# Patient Record
Sex: FEMALE | Employment: FULL TIME | URBAN - METROPOLITAN AREA
[De-identification: names, ages, dates, MRNs, and addresses within clinical notes are randomized per-mention and may not be internally consistent; named-entity substitution may affect disease eponyms.]

---

## 2023-12-21 ENCOUNTER — APPOINTMENT (OUTPATIENT)
Dept: ULTRASOUND IMAGING | Age: 28
End: 2023-12-21
Payer: COMMERCIAL

## 2023-12-21 ENCOUNTER — HOSPITAL ENCOUNTER (EMERGENCY)
Age: 28
Discharge: HOME | End: 2023-12-22
Attending: EMERGENCY MEDICINE
Payer: COMMERCIAL

## 2023-12-21 VITALS
TEMPERATURE: 97.5 F | RESPIRATION RATE: 16 BRPM | SYSTOLIC BLOOD PRESSURE: 122 MMHG | DIASTOLIC BLOOD PRESSURE: 72 MMHG | BODY MASS INDEX: 24.99 KG/M2 | HEART RATE: 81 BPM | OXYGEN SATURATION: 98 % | HEIGHT: 65 IN | WEIGHT: 150 LBS

## 2023-12-21 DIAGNOSIS — Z34.90 INTRAUTERINE PREGNANCY: Primary | ICD-10-CM

## 2023-12-21 LAB
AMORPH SED URNS QL MICRO: ABNORMAL /HPF
ANION GAP SERPL CALCULATED.3IONS-SCNC: 10 MMOL/L (ref 3–16)
B-HCG SERPL EIA 3RD IS-ACNC: NORMAL MIU/ML
BACTERIA URNS QL MICRO: ABNORMAL /HPF
BASOPHILS # BLD: 0 K/UL (ref 0–0.2)
BASOPHILS NFR BLD: 0.4 %
BILIRUB UR QL STRIP.AUTO: NEGATIVE
BUN SERPL-MCNC: 7 MG/DL (ref 7–20)
CALCIUM SERPL-MCNC: 9 MG/DL (ref 8.3–10.6)
CHLORIDE SERPL-SCNC: 103 MMOL/L (ref 99–110)
CLARITY UR: ABNORMAL
CO2 SERPL-SCNC: 25 MMOL/L (ref 21–32)
COLOR UR: YELLOW
CREAT SERPL-MCNC: <0.5 MG/DL (ref 0.6–1.1)
DEPRECATED RDW RBC AUTO: 12.9 % (ref 12.4–15.4)
EOSINOPHIL # BLD: 0.2 K/UL (ref 0–0.6)
EOSINOPHIL NFR BLD: 1.9 %
EPI CELLS #/AREA URNS HPF: ABNORMAL /HPF (ref 0–5)
GFR SERPLBLD CREATININE-BSD FMLA CKD-EPI: >60 ML/MIN/{1.73_M2}
GLUCOSE SERPL-MCNC: 98 MG/DL (ref 70–99)
GLUCOSE UR STRIP.AUTO-MCNC: NEGATIVE MG/DL
HCT VFR BLD AUTO: 44.9 % (ref 36–48)
HGB BLD-MCNC: 14.8 G/DL (ref 12–16)
HGB UR QL STRIP.AUTO: NEGATIVE
KETONES UR STRIP.AUTO-MCNC: NEGATIVE MG/DL
LEUKOCYTE ESTERASE UR QL STRIP.AUTO: ABNORMAL
LYMPHOCYTES # BLD: 2.7 K/UL (ref 1–5.1)
LYMPHOCYTES NFR BLD: 22.9 %
MCH RBC QN AUTO: 31.2 PG (ref 26–34)
MCHC RBC AUTO-ENTMCNC: 33.1 G/DL (ref 31–36)
MCV RBC AUTO: 94.2 FL (ref 80–100)
MONOCYTES # BLD: 0.8 K/UL (ref 0–1.3)
MONOCYTES NFR BLD: 6.6 %
MUCOUS THREADS #/AREA URNS LPF: ABNORMAL /LPF
NEUTROPHILS # BLD: 8 K/UL (ref 1.7–7.7)
NEUTROPHILS NFR BLD: 68.2 %
NITRITE UR QL STRIP.AUTO: NEGATIVE
PH UR STRIP.AUTO: 7.5 [PH] (ref 5–8)
PLATELET # BLD AUTO: 218 K/UL (ref 135–450)
PMV BLD AUTO: 8.9 FL (ref 5–10.5)
POTASSIUM SERPL-SCNC: 3.8 MMOL/L (ref 3.5–5.1)
PROT UR STRIP.AUTO-MCNC: NEGATIVE MG/DL
RBC # BLD AUTO: 4.77 M/UL (ref 4–5.2)
RBC #/AREA URNS HPF: ABNORMAL /HPF (ref 0–4)
SODIUM SERPL-SCNC: 138 MMOL/L (ref 136–145)
SP GR UR STRIP.AUTO: 1.01 (ref 1–1.03)
UA COMPLETE W REFLEX CULTURE PNL UR: ABNORMAL
UA DIPSTICK W REFLEX MICRO PNL UR: YES
URN SPEC COLLECT METH UR: ABNORMAL
UROBILINOGEN UR STRIP-ACNC: 0.2 E.U./DL
WBC # BLD AUTO: 11.7 K/UL (ref 4–11)
WBC #/AREA URNS HPF: ABNORMAL /HPF (ref 0–5)

## 2023-12-21 PROCEDURE — 76817 TRANSVAGINAL US OBSTETRIC: CPT

## 2023-12-21 PROCEDURE — 99284 EMERGENCY DEPT VISIT MOD MDM: CPT

## 2023-12-21 PROCEDURE — 6370000000 HC RX 637 (ALT 250 FOR IP): Performed by: EMERGENCY MEDICINE

## 2023-12-21 PROCEDURE — 86901 BLOOD TYPING SEROLOGIC RH(D): CPT

## 2023-12-21 PROCEDURE — 86850 RBC ANTIBODY SCREEN: CPT

## 2023-12-21 PROCEDURE — 84702 CHORIONIC GONADOTROPIN TEST: CPT

## 2023-12-21 PROCEDURE — 85025 COMPLETE CBC W/AUTO DIFF WBC: CPT

## 2023-12-21 PROCEDURE — 76801 OB US < 14 WKS SINGLE FETUS: CPT

## 2023-12-21 PROCEDURE — 87086 URINE CULTURE/COLONY COUNT: CPT

## 2023-12-21 PROCEDURE — 81001 URINALYSIS AUTO W/SCOPE: CPT

## 2023-12-21 PROCEDURE — 86900 BLOOD TYPING SEROLOGIC ABO: CPT

## 2023-12-21 PROCEDURE — 36415 COLL VENOUS BLD VENIPUNCTURE: CPT

## 2023-12-21 PROCEDURE — 80048 BASIC METABOLIC PNL TOTAL CA: CPT

## 2023-12-21 RX ORDER — CEPHALEXIN 500 MG/1
500 CAPSULE ORAL ONCE
Status: COMPLETED | OUTPATIENT
Start: 2023-12-21 | End: 2023-12-21

## 2023-12-21 RX ORDER — ACETAMINOPHEN 500 MG
1000 TABLET ORAL ONCE
Status: COMPLETED | OUTPATIENT
Start: 2023-12-21 | End: 2023-12-21

## 2023-12-21 RX ADMIN — ACETAMINOPHEN 1000 MG: 500 TABLET ORAL at 20:58

## 2023-12-21 RX ADMIN — CEPHALEXIN 500 MG: 500 CAPSULE ORAL at 22:09

## 2023-12-21 ASSESSMENT — PAIN SCALES - GENERAL: PAINLEVEL_OUTOF10: 7

## 2023-12-21 ASSESSMENT — LIFESTYLE VARIABLES
HOW OFTEN DO YOU HAVE A DRINK CONTAINING ALCOHOL: NEVER
HOW MANY STANDARD DRINKS CONTAINING ALCOHOL DO YOU HAVE ON A TYPICAL DAY: PATIENT DOES NOT DRINK

## 2023-12-21 ASSESSMENT — PAIN - FUNCTIONAL ASSESSMENT: PAIN_FUNCTIONAL_ASSESSMENT: 0-10

## 2023-12-22 LAB
ABO + RH BLD: NORMAL
BLD GP AB SCN SERPL QL: NORMAL

## 2023-12-22 RX ORDER — CEPHALEXIN 500 MG/1
500 CAPSULE ORAL 4 TIMES DAILY
Qty: 20 CAPSULE | Refills: 0 | Status: SHIPPED | OUTPATIENT
Start: 2023-12-22 | End: 2023-12-27

## 2023-12-22 NOTE — DISCHARGE INSTRUCTIONS
Please follow up with OB GYN for further evaluation and treatment. US shows single live intrauterine gestation with estimated fetal age of 9 wks and 3 days. Please take the keflex as prescribed. Follow up with the result of the urine culture and ask for further guidance on whether to continue the antibiotic with your PCP or ob gyn. If persistent or worsening symptoms, or if you have any concerns, return to ED immediately.

## 2023-12-22 NOTE — ED PROVIDER NOTES
Patient care handed off to me pending ultrasound at shift change. In summary, patient is a 69-year-old female presents to the emergency department for lower back pain for the last 2 weeks. Says the back pain has been worse with movement and with bending or twisting movements. She has tried lidocaine patches and heat packs with some improvement of pain. She believes she is approximately 7 weeks pregnant based on her last menstrual period. Denies having any abdominal pain/tenderness. Urine culture added on. Ultrasound shows single live intrauterine gestation with estimated fetal age of 7 weeks and 3 days. She was given referral for OB/GYN. Given prescription of Keflex and instructed to follow-up with OB/GYN for further evaluation and treatment. Patient agreeable with plan and verbalized understanding. Denies having any questions or concerns. Discharged home with strict return emergency. ICD-10-CM    1.  Intrauterine pregnancy  Z34.90 [facility]             Keshia Sidhu MD  12/22/23 [id number]

## 2023-12-23 LAB — BACTERIA UR CULT: NORMAL

## 2023-12-27 ENCOUNTER — OFFICE VISIT (OUTPATIENT)
Age: 28
End: 2023-12-27

## 2023-12-27 VITALS
WEIGHT: 150 LBS | TEMPERATURE: 98.5 F | OXYGEN SATURATION: 95 % | HEART RATE: 94 BPM | DIASTOLIC BLOOD PRESSURE: 82 MMHG | SYSTOLIC BLOOD PRESSURE: 119 MMHG | RESPIRATION RATE: 17 BRPM | BODY MASS INDEX: 24.99 KG/M2 | HEIGHT: 65 IN

## 2023-12-27 DIAGNOSIS — N39.0 URINARY TRACT INFECTION WITHOUT HEMATURIA, SITE UNSPECIFIED: Primary | ICD-10-CM

## 2023-12-27 DIAGNOSIS — N30.00 ACUTE CYSTITIS WITHOUT HEMATURIA: ICD-10-CM

## 2023-12-27 LAB
BILIRUBIN, POC: ABNORMAL
BLOOD URINE, POC: ABNORMAL
CLARITY, POC: ABNORMAL
COLOR, POC: YELLOW
GLUCOSE URINE, POC: ABNORMAL
KETONES, POC: ABNORMAL
LEUKOCYTE EST, POC: ABNORMAL
NITRITE, POC: ABNORMAL
PH, POC: 6
PROTEIN, POC: ABNORMAL
SPECIFIC GRAVITY, POC: 1.02
UROBILINOGEN, POC: 0.2

## 2023-12-27 RX ORDER — NITROFURANTOIN 25; 75 MG/1; MG/1
100 CAPSULE ORAL 2 TIMES DAILY
Qty: 20 CAPSULE | Refills: 0 | Status: SHIPPED | OUTPATIENT
Start: 2023-12-27 | End: 2023-12-27

## 2023-12-27 NOTE — PROGRESS NOTES
Enio Blair (: 1995) is a 33 y.o. female, New patient, here for evaluation of the following chief complaint(s):  Urinary Tract Infection (Patient does get UTI all the time and went to the ER and was told she has UTI sharp lower right hip back area, and does not feel like it' went away , and feels like she has kidney pain, patient is pregnant 7 weeks and 2 days. [facility]  )      ASSESSMENT/PLAN:    ICD-10-CM    1. Urinary tract infection without hematuria, site unspecified  N39.0 POCT Urinalysis no Micro     Culture, Urine     DISCONTINUED: nitrofurantoin, macrocrystal-monohydrate, (MACROBID) 100 MG capsule      2. Acute cystitis without hematuria  N30.00 Culture, Urine          - Low concern for pyelonephritis, PID or sepsis. Will wait for urine culture to come back in the next 2-3 days.  - Pt to drink lots of fluids  - Pt to wait to take antibiotic prescribed until given the ok from her PCP  - Pt ok to take tylenol as needed  - Pt to call if any symptoms worsen or follow up with PCP  - Pt to go to ER if have shortness of breath or chest pain      Follow up with your PCP or return to the clinic in 5 days if symptoms persist or if symptoms worsen. Reviewed AVS with treatment instructions and answered questions - pt expresses understanding and agreement with the discussed treatment plan and AVS instructions. SUBJECTIVE/OBJECTIVE:  HPI:   33 y.o. female presents for complaint of a week ago she states she was having lower back pain and pt was seen in the hospital and they had seen abnormalities in the urine so they put her on keflex for 5 days and she states she just finished the antibiotic but she is still experiencing lower back pain. Denies symptoms of fevers, body aches, chills, sweats, abdominal pain, flank pain, urinary urgency, frequency, dysuria, hematuria, vomiting, diarrhea, vaginal discharge, bleeding, odor, itching or rash. Has taken the 5 days of keflex and finished it.  Pt
[id number]/yes

## 2023-12-27 NOTE — PATIENT INSTRUCTIONS
- Pt to drink lots of fluids  - Pt to take all of antibiotic prescribed  - Pt ok to take tylenol as needed  - Pt to call if any symptoms worsen or follow up with PCP  - Pt to go to ER if have shortness of breath or chest pain

## 2023-12-29 LAB — BACTERIA UR CULT: NORMAL

## 2023-12-30 SDOH — HEALTH STABILITY: PHYSICAL HEALTH: ON AVERAGE, HOW MANY MINUTES DO YOU ENGAGE IN EXERCISE AT THIS LEVEL?: 10 MIN

## 2023-12-30 SDOH — HEALTH STABILITY: PHYSICAL HEALTH: ON AVERAGE, HOW MANY DAYS PER WEEK DO YOU ENGAGE IN MODERATE TO STRENUOUS EXERCISE (LIKE A BRISK WALK)?: 0 DAYS

## 2024-01-02 ENCOUNTER — OFFICE VISIT (OUTPATIENT)
Dept: PRIMARY CARE CLINIC | Age: 29
End: 2024-01-02
Payer: COMMERCIAL

## 2024-01-02 VITALS
WEIGHT: 153 LBS | DIASTOLIC BLOOD PRESSURE: 78 MMHG | TEMPERATURE: 97.9 F | SYSTOLIC BLOOD PRESSURE: 132 MMHG | HEART RATE: 91 BPM | HEIGHT: 65 IN | OXYGEN SATURATION: 97 % | BODY MASS INDEX: 25.49 KG/M2

## 2024-01-02 DIAGNOSIS — R53.83 OTHER FATIGUE: ICD-10-CM

## 2024-01-02 DIAGNOSIS — Z76.89 ENCOUNTER TO ESTABLISH CARE: Primary | ICD-10-CM

## 2024-01-02 DIAGNOSIS — Z87.440 RECENT URINARY TRACT INFECTION: ICD-10-CM

## 2024-01-02 DIAGNOSIS — Z3A.08 8 WEEKS GESTATION OF PREGNANCY: ICD-10-CM

## 2024-01-02 DIAGNOSIS — Z88.9 HX OF SEASONAL ALLERGIES: ICD-10-CM

## 2024-01-02 LAB
BILIRUBIN, POC: NEGATIVE
BLOOD URINE, POC: NORMAL
CLARITY, POC: CLEAR
COLOR, POC: YELLOW
GLUCOSE URINE, POC: NEGATIVE
KETONES, POC: NEGATIVE
LEUKOCYTE EST, POC: NORMAL
NITRITE, POC: NEGATIVE
PH, POC: 6.5
PROTEIN, POC: NORMAL
SPECIFIC GRAVITY, POC: 1.02
UROBILINOGEN, POC: NORMAL

## 2024-01-02 PROCEDURE — 81002 URINALYSIS NONAUTO W/O SCOPE: CPT

## 2024-01-02 PROCEDURE — 99204 OFFICE O/P NEW MOD 45 MIN: CPT

## 2024-01-02 SDOH — ECONOMIC STABILITY: FOOD INSECURITY: WITHIN THE PAST 12 MONTHS, THE FOOD YOU BOUGHT JUST DIDN'T LAST AND YOU DIDN'T HAVE MONEY TO GET MORE.: NEVER TRUE

## 2024-01-02 SDOH — ECONOMIC STABILITY: HOUSING INSECURITY
IN THE LAST 12 MONTHS, WAS THERE A TIME WHEN YOU DID NOT HAVE A STEADY PLACE TO SLEEP OR SLEPT IN A SHELTER (INCLUDING NOW)?: NO

## 2024-01-02 SDOH — ECONOMIC STABILITY: INCOME INSECURITY: HOW HARD IS IT FOR YOU TO PAY FOR THE VERY BASICS LIKE FOOD, HOUSING, MEDICAL CARE, AND HEATING?: NOT HARD AT ALL

## 2024-01-02 SDOH — ECONOMIC STABILITY: FOOD INSECURITY: WITHIN THE PAST 12 MONTHS, YOU WORRIED THAT YOUR FOOD WOULD RUN OUT BEFORE YOU GOT MONEY TO BUY MORE.: NEVER TRUE

## 2024-01-02 ASSESSMENT — ANXIETY QUESTIONNAIRES
6. BECOMING EASILY ANNOYED OR IRRITABLE: 1
7. FEELING AFRAID AS IF SOMETHING AWFUL MIGHT HAPPEN: 0
5. BEING SO RESTLESS THAT IT IS HARD TO SIT STILL: 0
2. NOT BEING ABLE TO STOP OR CONTROL WORRYING: 1
4. TROUBLE RELAXING: 1
IF YOU CHECKED OFF ANY PROBLEMS ON THIS QUESTIONNAIRE, HOW DIFFICULT HAVE THESE PROBLEMS MADE IT FOR YOU TO DO YOUR WORK, TAKE CARE OF THINGS AT HOME, OR GET ALONG WITH OTHER PEOPLE: SOMEWHAT DIFFICULT
1. FEELING NERVOUS, ANXIOUS, OR ON EDGE: 0
GAD7 TOTAL SCORE: 4
3. WORRYING TOO MUCH ABOUT DIFFERENT THINGS: 1

## 2024-01-02 ASSESSMENT — PATIENT HEALTH QUESTIONNAIRE - PHQ9
SUM OF ALL RESPONSES TO PHQ QUESTIONS 1-9: 15
3. TROUBLE FALLING OR STAYING ASLEEP: 3
4. FEELING TIRED OR HAVING LITTLE ENERGY: 3
5. POOR APPETITE OR OVEREATING: 2
2. FEELING DOWN, DEPRESSED OR HOPELESS: 3
SUM OF ALL RESPONSES TO PHQ9 QUESTIONS 1 & 2: 6
7. TROUBLE CONCENTRATING ON THINGS, SUCH AS READING THE NEWSPAPER OR WATCHING TELEVISION: 1
10. IF YOU CHECKED OFF ANY PROBLEMS, HOW DIFFICULT HAVE THESE PROBLEMS MADE IT FOR YOU TO DO YOUR WORK, TAKE CARE OF THINGS AT HOME, OR GET ALONG WITH OTHER PEOPLE: 1
8. MOVING OR SPEAKING SO SLOWLY THAT OTHER PEOPLE COULD HAVE NOTICED. OR THE OPPOSITE, BEING SO FIGETY OR RESTLESS THAT YOU HAVE BEEN MOVING AROUND A LOT MORE THAN USUAL: 0
6. FEELING BAD ABOUT YOURSELF - OR THAT YOU ARE A FAILURE OR HAVE LET YOURSELF OR YOUR FAMILY DOWN: 0
SUM OF ALL RESPONSES TO PHQ QUESTIONS 1-9: 15
SUM OF ALL RESPONSES TO PHQ QUESTIONS 1-9: 15
1. LITTLE INTEREST OR PLEASURE IN DOING THINGS: 3
9. THOUGHTS THAT YOU WOULD BE BETTER OFF DEAD, OR OF HURTING YOURSELF: 0
SUM OF ALL RESPONSES TO PHQ QUESTIONS 1-9: 15

## 2024-01-02 NOTE — PROGRESS NOTES
true     Ran Out of Food in the Last Year: Never true   Transportation Needs: Unknown (1/2/2024)    PRAPARE - Transportation     Lack of Transportation (Medical): Not on file     Lack of Transportation (Non-Medical): No   Physical Activity: Inactive (12/30/2023)    Exercise Vital Sign     Days of Exercise per Week: 0 days     Minutes of Exercise per Session: 10 min   Stress: Not on file   Social Connections: Not on file   Intimate Partner Violence: Not on file   Housing Stability: Unknown (1/2/2024)    Housing Stability Vital Sign     Unable to Pay for Housing in the Last Year: Not on file     Number of Places Lived in the Last Year: Not on file     Unstable Housing in the Last Year: No          Objective:   Physical Exam  Constitutional:       Appearance: Normal appearance.   HENT:      Head: Normocephalic and atraumatic.      Nose: Nose normal. No congestion or rhinorrhea.   Eyes:      Extraocular Movements: Extraocular movements intact.      Conjunctiva/sclera: Conjunctivae normal.      Pupils: Pupils are equal, round, and reactive to light.   Cardiovascular:      Rate and Rhythm: Normal rate and regular rhythm.      Pulses: Normal pulses.      Heart sounds: Normal heart sounds.   Pulmonary:      Effort: Pulmonary effort is normal.      Breath sounds: Normal breath sounds.   Abdominal:      General: Abdomen is flat. Bowel sounds are normal.      Palpations: Abdomen is soft.      Tenderness: There is no abdominal tenderness. There is no right CVA tenderness, left CVA tenderness, guarding or rebound.   Musculoskeletal:         General: Normal range of motion.   Skin:     General: Skin is warm and dry.   Neurological:      General: No focal deficit present.      Mental Status: She is alert and oriented to person, place, and time.   Psychiatric:         Mood and Affect: Mood normal.         Behavior: Behavior normal.         Assessment / Plan:     1. Encounter to establish care  -Chart/records reviewed, history and

## 2024-01-04 LAB — BACTERIA UR CULT: NORMAL

## 2024-01-11 ENCOUNTER — INITIAL PRENATAL (OUTPATIENT)
Dept: OBGYN CLINIC | Age: 29
End: 2024-01-11

## 2024-01-11 VITALS
SYSTOLIC BLOOD PRESSURE: 115 MMHG | WEIGHT: 151 LBS | HEART RATE: 95 BPM | BODY MASS INDEX: 25.13 KG/M2 | DIASTOLIC BLOOD PRESSURE: 75 MMHG | TEMPERATURE: 97.8 F | OXYGEN SATURATION: 97 %

## 2024-01-11 DIAGNOSIS — Z34.91 PRENATAL CARE IN FIRST TRIMESTER: Primary | ICD-10-CM

## 2024-01-11 PROCEDURE — 0500F INITIAL PRENATAL CARE VISIT: CPT | Performed by: OBSTETRICS & GYNECOLOGY

## 2024-01-11 NOTE — PROGRESS NOTES
28 y.o.  at Unknown EGA Estimated Date of Delivery: None noted. here for Initial Prenatal Visit:     Pt seen and examined. No concerns/complaints. She reports occasional aching. She was treated UTI in the ER.   Denies VB, LOF, CTX. Denies FM yet.   Denies fevers / chills / chest pain / shortness of breath.   She reports some issues with nausea. She reports no vomiting and that she is keeping food down.     ObHx: primigravida   GynHx: denies abnormal pap smears. Denies genital herpes. Denies gonorrhea or chlymdia; reports irregular cycles LMP 10-30-   PMH: denies   PSH: denies   All: Amoxil- left ring finger swells up and gets red   PNV   SH: denies drinking, smoking or drug use     Objective:  /75   Pulse 95   Temp 97.8 °F (36.6 °C) (Infrared)   Wt 68.5 kg (151 lb)   LMP 10/30/2023 (Exact Date)   SpO2 97%   BMI 25.13 kg/m²   Physical Exam  Vitals and nursing note reviewed. Exam conducted with a chaperone present.   Constitutional:       Appearance: Normal appearance.   HENT:      Head: Normocephalic and atraumatic.   Eyes:      General:         Right eye: No discharge.         Left eye: No discharge.      Conjunctiva/sclera: Conjunctivae normal.   Cardiovascular:      Rate and Rhythm: Normal rate and regular rhythm.      Heart sounds: Normal heart sounds.   Pulmonary:      Effort: Pulmonary effort is normal.      Breath sounds: No stridor. No wheezing or rhonchi.   Chest:   Breasts:     Right: No inverted nipple, mass, nipple discharge, skin change or tenderness.      Left: No inverted nipple, mass, nipple discharge, skin change or tenderness.   Abdominal:      General: Abdomen is flat.      Palpations: Abdomen is soft.      Tenderness: There is no abdominal tenderness. There is no guarding or rebound.   Genitourinary:     General: Normal vulva.      Exam position: Lithotomy position.      Labia:         Right: No tenderness or lesion.         Left: No tenderness or lesion.       Vagina: No

## 2024-01-12 LAB
BACTERIA UR CULT: NORMAL
BACTERIA URNS QL MICRO: ABNORMAL /HPF
BILIRUB UR QL STRIP.AUTO: NEGATIVE
C TRACH DNA CVX QL NAA+PROBE: NEGATIVE
CLARITY UR: ABNORMAL
COLOR UR: YELLOW
EPI CELLS #/AREA URNS AUTO: 4 /HPF (ref 0–5)
GLUCOSE UR STRIP.AUTO-MCNC: 250 MG/DL
HGB UR QL STRIP.AUTO: NEGATIVE
HYALINE CASTS #/AREA URNS AUTO: 0 /LPF (ref 0–8)
KETONES UR STRIP.AUTO-MCNC: ABNORMAL MG/DL
LEUKOCYTE ESTERASE UR QL STRIP.AUTO: NEGATIVE
N GONORRHOEA DNA CERV MUCUS QL NAA+PROBE: NEGATIVE
NITRITE UR QL STRIP.AUTO: NEGATIVE
PH UR STRIP.AUTO: 6.5 [PH] (ref 5–8)
PROT UR STRIP.AUTO-MCNC: ABNORMAL MG/DL
RBC CLUMPS #/AREA URNS AUTO: 4 /HPF (ref 0–4)
SP GR UR STRIP.AUTO: 1.02 (ref 1–1.03)
UA DIPSTICK W REFLEX MICRO PNL UR: YES
URN SPEC COLLECT METH UR: ABNORMAL
UROBILINOGEN UR STRIP-ACNC: 0.2 E.U./DL
WBC #/AREA URNS AUTO: 1 /HPF (ref 0–5)

## 2024-02-07 ENCOUNTER — ROUTINE PRENATAL (OUTPATIENT)
Dept: OBGYN CLINIC | Age: 29
End: 2024-02-07
Payer: COMMERCIAL

## 2024-02-07 VITALS
TEMPERATURE: 97.6 F | SYSTOLIC BLOOD PRESSURE: 115 MMHG | WEIGHT: 154.2 LBS | BODY MASS INDEX: 25.66 KG/M2 | HEART RATE: 89 BPM | DIASTOLIC BLOOD PRESSURE: 85 MMHG

## 2024-02-07 DIAGNOSIS — Z34.92 PRENATAL CARE IN SECOND TRIMESTER: Primary | ICD-10-CM

## 2024-02-07 PROCEDURE — 0502F SUBSEQUENT PRENATAL CARE: CPT | Performed by: OBSTETRICS & GYNECOLOGY

## 2024-02-07 PROCEDURE — 36415 COLL VENOUS BLD VENIPUNCTURE: CPT | Performed by: OBSTETRICS & GYNECOLOGY

## 2024-02-07 NOTE — PROGRESS NOTES
29 y.o.  at 13w2d EGA Estimated Date of Delivery: 24 here for MADIE:     Pt seen and examined. She reports some nausea at night.   Denies VB, LOF, CTX. Endorses (+) FM. Denies fevers / chills / chest pain / shortness of breath.   Denies HA, changes with vision, RUQ pain, edema.     Objective:  /85   Pulse 89   Temp 97.6 °F (36.4 °C) (Infrared)   Wt 69.9 kg (154 lb 3.2 oz)   LMP 10/30/2023 (Exact Date)   BMI 25.66 kg/m²   Gen: AO, NAD  Abd: Soft, NT, gravid   Ext: Mild LE edema    Assessment/Plan:   Diagnosis Orders   1. Prenatal care in second trimester  Syphilis Antibody Cascading Reflex    Varicella Zoster Antibody, IgG    Drug Screen Multi Urine With Bup    Rubella antibody, IgG    Type and Screen    HIV Screen    CBC with Auto Differential    Hep C AB RLFX HCV PCR-A    Hepatitis B Surface Antigen          Diagnosis Orders   1. Prenatal care in second trimester  Syphilis Antibody Cascading Reflex    Varicella Zoster Antibody, IgG    Drug Screen Multi Urine With Bup    Rubella antibody, IgG    Type and Screen    HIV Screen    CBC with Auto Differential    Hep C AB RLFX HCV PCR-A    Hepatitis B Surface Antigen   Reassuring fetal / maternal status today  Aneuploidy / Carrier Screen: sent today     AFP: 15-20   PNL: sent today   Anatomy: plan for here  28 wk: GCT  Hgb Plt   Tdap: at 28 wks    GBS: 35-37 wks   Birth Plan:    Breastfeeding Education:        - reassuring maternal / fetal status     Follow Up  Return OB precautions reviewed   No follow-ups on file.    Approximately 15 minutes spent in room counseling patient on condition and coordination of care with over 50% in direct face to face counseling.     Mahogany Guillaume DO

## 2024-02-08 LAB
ABO + RH BLD: NORMAL
AMPHETAMINES UR QL SCN>1000 NG/ML: NORMAL
BARBITURATES UR QL SCN>200 NG/ML: NORMAL
BASOPHILS # BLD: 0.1 K/UL (ref 0–0.2)
BASOPHILS NFR BLD: 0.5 %
BENZODIAZ UR QL SCN>200 NG/ML: NORMAL
BLD GP AB SCN SERPL QL: NORMAL
BUPRENORPHINE+NOR UR QL SCN: NORMAL
CANNABINOIDS UR QL SCN>50 NG/ML: NORMAL
COCAINE UR QL SCN: NORMAL
DEPRECATED RDW RBC AUTO: 13.2 % (ref 12.4–15.4)
DRUG SCREEN COMMENT UR-IMP: NORMAL
EOSINOPHIL # BLD: 0.1 K/UL (ref 0–0.6)
EOSINOPHIL NFR BLD: 1.5 %
FENTANYL SCREEN, URINE: NORMAL
HBV SURFACE AG SERPL QL IA: NORMAL
HCT VFR BLD AUTO: 42.4 % (ref 36–48)
HGB BLD-MCNC: 14.6 G/DL (ref 12–16)
HIV 1+2 AB+HIV1 P24 AG SERPL QL IA: NORMAL
HIV 2 AB SERPL QL IA: NORMAL
HIV1 AB SERPL QL IA: NORMAL
HIV1 P24 AG SERPL QL IA: NORMAL
LYMPHOCYTES # BLD: 1.6 K/UL (ref 1–5.1)
LYMPHOCYTES NFR BLD: 16.7 %
MCH RBC QN AUTO: 31.8 PG (ref 26–34)
MCHC RBC AUTO-ENTMCNC: 34.5 G/DL (ref 31–36)
MCV RBC AUTO: 92.4 FL (ref 80–100)
METHADONE UR QL SCN>300 NG/ML: NORMAL
MONOCYTES # BLD: 0.5 K/UL (ref 0–1.3)
MONOCYTES NFR BLD: 5.4 %
NEUTROPHILS # BLD: 7.5 K/UL (ref 1.7–7.7)
NEUTROPHILS NFR BLD: 75.9 %
OPIATES UR QL SCN>300 NG/ML: NORMAL
OXYCODONE UR QL SCN: NORMAL
PCP UR QL SCN>25 NG/ML: NORMAL
PH UR STRIP: 7 [PH]
PLATELET # BLD AUTO: 191 K/UL (ref 135–450)
PMV BLD AUTO: 9.9 FL (ref 5–10.5)
RBC # BLD AUTO: 4.59 M/UL (ref 4–5.2)
REAGIN+T PALLIDUM IGG+IGM SERPL-IMP: NORMAL
RUBV IGG SERPL IA-ACNC: 6.7 IU/ML
VZV IGG SER QL IA: NORMAL
WBC # BLD AUTO: 9.8 K/UL (ref 4–11)

## 2024-02-09 LAB
HCV AB S/CO SERPL IA: 0.06 IV
HCV AB SERPL QL IA: NEGATIVE

## 2024-02-12 ENCOUNTER — TELEPHONE (OUTPATIENT)
Dept: OBGYN CLINIC | Age: 29
End: 2024-02-12

## 2024-02-12 NOTE — TELEPHONE ENCOUNTER
----- Message from Mahogany Guillaume DO sent at 2/12/2024  8:55 AM EST -----  Can you please let the patient know that this was the test was ordered?     ----- Message -----  From: Brittny Gutiérrez LPN  Sent: 2/12/2024   8:31 AM EST  To: Mahogany Guillaume, DO    The testing ordered was [id number] with ESS & SCA .     Thank you     Brittny

## 2024-02-16 ENCOUNTER — TELEPHONE (OUTPATIENT)
Dept: OBGYN CLINIC | Age: 29
End: 2024-02-16

## 2024-02-16 NOTE — TELEPHONE ENCOUNTER
Called patient and gave [id number] Results per noted by DR Guillaume. Normal results. Female Fetus.     Patient aware. Results scanned. DONE

## 2024-02-19 ENCOUNTER — OFFICE VISIT (OUTPATIENT)
Age: 29
End: 2024-02-19

## 2024-02-19 VITALS
TEMPERATURE: 98.6 F | BODY MASS INDEX: 26.66 KG/M2 | HEIGHT: 65 IN | DIASTOLIC BLOOD PRESSURE: 76 MMHG | HEART RATE: 109 BPM | SYSTOLIC BLOOD PRESSURE: 113 MMHG | WEIGHT: 160 LBS | RESPIRATION RATE: 16 BRPM | OXYGEN SATURATION: 96 %

## 2024-02-19 DIAGNOSIS — R05.9 COUGH, UNSPECIFIED TYPE: ICD-10-CM

## 2024-02-19 DIAGNOSIS — R52 BODY ACHES: ICD-10-CM

## 2024-02-19 DIAGNOSIS — H67.1: Primary | ICD-10-CM

## 2024-02-19 DIAGNOSIS — U07.1: Primary | ICD-10-CM

## 2024-02-19 LAB
INFLUENZA A ANTIBODY: NEGATIVE
INFLUENZA B ANTIBODY: NEGATIVE
Lab: ABNORMAL
PERFORMING INSTRUMENT: ABNORMAL
QC PASS/FAIL: ABNORMAL
SARS-COV-2, POC: DETECTED

## 2024-02-19 RX ORDER — AZITHROMYCIN 250 MG/1
TABLET, FILM COATED ORAL
Qty: 6 TABLET | Refills: 0 | Status: SHIPPED | OUTPATIENT
Start: 2024-02-19 | End: 2024-02-29

## 2024-02-19 ASSESSMENT — ENCOUNTER SYMPTOMS
VOMITING: 0
COUGH: 1
ABDOMINAL DISTENTION: 0
NAUSEA: 0
SORE THROAT: 1
SHORTNESS OF BREATH: 0

## 2024-02-19 NOTE — PROGRESS NOTES
Vivian An (:  1995) is a 29 y.o. female,Established patient, here for evaluation of the following chief complaint(s):  Cough (X 3 days), Pharyngitis, Generalized Body Aches, and Congestion      ASSESSMENT/PLAN:    ICD-10-CM    1. Otitis media of right ear due to severe acute respiratory syndrome coronavirus 2 (SARS-CoV-2)  U07.1 azithromycin (ZITHROMAX) 250 MG tablet    H67.1       2. Body aches  R52 POCT Influenza A/B     POCT COVID-19, Antigen     CANCELED: POCT COVID-19, Antigen      3. Cough, unspecified type  R05.9 POCT Influenza A/B     POCT COVID-19, Antigen     CANCELED: POCT COVID-19, Antigen          Take medication as prescribed.   Rest and Increase fluids.   Call your obgyn and let them know you have covid.    Antibiotic for ear is Category B: okay in pregnancy.     Follow up with your PCP in the next 1 week.     SUBJECTIVE/OBJECTIVE:  Pt presents with cough, body aches, ear pain, and congestion for 3 days. Is 15 weeks pregnant. No known fevers. Hs been around sick contacts at work. No OTC meds used. Does state her left lower back has been hurting her as well. Did have a UTI the end of December but states has improved since and no longer has dysuria, ect.       History provided by:  Patient   used: No    Cough  The current episode started in the past 7 days. The problem has been waxing and waning. Associated symptoms include ear pain, headaches, myalgias, nasal congestion, postnasal drip and a sore throat. Pertinent negatives include no fever, rash or shortness of breath.   Pharyngitis  Associated symptoms: congestion, cough, ear pain, fatigue, headaches, myalgias and sore throat    Associated symptoms: no fever, no nausea, no rash, no shortness of breath and no vomiting    Generalized Body Aches  Associated symptoms: congestion, cough, ear pain, fatigue, headaches, myalgias and sore throat    Associated symptoms: no fever, no nausea, no rash, no shortness of breath and no

## 2024-02-19 NOTE — PATIENT INSTRUCTIONS
Take medication as prescribed.   Rest and Increase fluids.  Call your obgyn and let them know you have covid.    Antibiotic for ear is Category B: okay in pregnancy.     Follow up with your PCP in the next 1 week.

## 2024-02-29 ENCOUNTER — ROUTINE PRENATAL (OUTPATIENT)
Dept: OBGYN CLINIC | Age: 29
End: 2024-02-29

## 2024-02-29 VITALS
HEART RATE: 89 BPM | SYSTOLIC BLOOD PRESSURE: 110 MMHG | DIASTOLIC BLOOD PRESSURE: 68 MMHG | WEIGHT: 159 LBS | BODY MASS INDEX: 26.46 KG/M2

## 2024-02-29 DIAGNOSIS — Z34.92 PRENATAL CARE IN SECOND TRIMESTER: Primary | ICD-10-CM

## 2024-02-29 DIAGNOSIS — Z86.16 HISTORY OF COVID-19: ICD-10-CM

## 2024-02-29 PROCEDURE — 0502F SUBSEQUENT PRENATAL CARE: CPT | Performed by: OBSTETRICS & GYNECOLOGY

## 2024-02-29 NOTE — PROGRESS NOTES
29 y.o.  at 16w3d EGA Estimated Date of Delivery: 24 here for MADIE:     Pt seen and examined. No concerns/complaints.  Reports some cramping but not today. Denies VB, LOF, CTX. Endorses (+) FM. Denies fevers / chills / chest pain / shortness of breath.   Denies HA, changes with vision, RUQ pain, edema.   MWQ reviewed.     Objective:  /68   Pulse 89   Wt 72.1 kg (159 lb)   LMP 10/30/2023 (Exact Date)   BMI 26.46 kg/m²   Gen: AO, NAD  Abd: Soft, NT, gravid   Ext: Mild LE edema    Assessment/Plan:   Diagnosis Orders   1. Prenatal care in second trimester        2. History of COVID-19            Diagnosis Orders   1. Prenatal care in second trimester     Reassuring fetal / maternal status today  Aneuploidy / Carrier Screen: neg female     AFP: declined  PNL: B+/-, Hep B/C neg, HIV neg, Rub Nonimmune, Varicella nonimmune, syph nr, Justin/chla neg, Urine culture negative.   Anatomy: next visit  28 wk: GCT  Hgb Plt   Tdap: at 28 wks    GBS: 35-37 wks   Birth Plan:    Breastfeeding Education:    2. History of COVID-19     - Started patient on baby asa 81 mg   - Discussed with patient serial growth US due to COVID 19 infection during pregnancy.      - reassuring maternal / fetal status     Follow Up  Return OB precautions reviewed   No follow-ups on file.    Approximately 15 minutes spent in room counseling patient on condition and coordination of care with over 50% in direct face to face counseling.     Mahogany Guillaume DO

## 2024-03-23 ENCOUNTER — OFFICE VISIT (OUTPATIENT)
Age: 29
End: 2024-03-23

## 2024-03-23 VITALS
HEART RATE: 116 BPM | DIASTOLIC BLOOD PRESSURE: 79 MMHG | OXYGEN SATURATION: 96 % | SYSTOLIC BLOOD PRESSURE: 114 MMHG | TEMPERATURE: 98.5 F

## 2024-03-23 DIAGNOSIS — J01.10 ACUTE NON-RECURRENT FRONTAL SINUSITIS: Primary | ICD-10-CM

## 2024-03-23 RX ORDER — CEPHALEXIN 500 MG/1
500 CAPSULE ORAL 2 TIMES DAILY
Qty: 20 CAPSULE | Refills: 0 | Status: SHIPPED | OUTPATIENT
Start: 2024-03-23 | End: 2024-04-02

## 2024-03-23 ASSESSMENT — ENCOUNTER SYMPTOMS
NAUSEA: 0
SHORTNESS OF BREATH: 0
SORE THROAT: 0
ABDOMINAL PAIN: 0
SINUS PRESSURE: 1
SINUS PAIN: 1
VOMITING: 0
COUGH: 1

## 2024-03-23 NOTE — PROGRESS NOTES
Vivian An (:  1995) is a 29 y.o. female,Established patient, here for evaluation of the following chief complaint(s):  Congestion, Cough (X 2-3 weeks ), and Headache      ASSESSMENT/PLAN:    ICD-10-CM    1. Acute non-recurrent frontal sinusitis  J01.10 cephALEXin (KEFLEX) 500 MG capsule          Take medication as prescribed.   Rest and Increase fluids.  Try taking a daily antihistamine such as Claritin or Zyrtec.     Follow up with your PCP in the next 1 week.     SUBJECTIVE/OBJECTIVE:    History provided by:  Patient   used: No    Cough  Associated symptoms include headaches. Pertinent negatives include no ear pain, fever, myalgias, rash, sore throat or shortness of breath.   Headache    HPI:   29 y.o. female presents with symptoms of congestion, cough, headache, and sinus pressure ongoing since 2-3 weeks. Denies fever, SOB, aches. Has taken sinus rinses, mucinex, nasal spray for symptoms without much relief. Has not seen her PCP since this began.     Vitals:    24 1238   BP: 114/79   Pulse: (!) 116   Temp: 98.5 °F (36.9 °C)   TempSrc: Temporal   SpO2: 96%       Review of Systems   Constitutional:  Negative for fatigue and fever.   HENT:  Positive for congestion, sinus pressure and sinus pain. Negative for ear pain and sore throat.    Respiratory:  Positive for cough. Negative for shortness of breath.    Gastrointestinal:  Negative for abdominal pain, nausea and vomiting.   Musculoskeletal:  Negative for myalgias.   Skin:  Negative for rash.   Neurological:  Positive for headaches.       Physical Exam  Constitutional:       General: She is not in acute distress.     Appearance: Normal appearance.   HENT:      Right Ear: Tympanic membrane normal.      Left Ear: Tympanic membrane normal.      Nose:      Right Sinus: Maxillary sinus tenderness and frontal sinus tenderness present.      Left Sinus: Maxillary sinus tenderness and frontal sinus tenderness present.      Comments:

## 2024-03-28 ENCOUNTER — ROUTINE PRENATAL (OUTPATIENT)
Dept: OBGYN CLINIC | Age: 29
End: 2024-03-28

## 2024-03-28 VITALS
DIASTOLIC BLOOD PRESSURE: 84 MMHG | BODY MASS INDEX: 27.92 KG/M2 | OXYGEN SATURATION: 97 % | HEIGHT: 65 IN | TEMPERATURE: 98.6 F | WEIGHT: 167.6 LBS | SYSTOLIC BLOOD PRESSURE: 126 MMHG | HEART RATE: 86 BPM

## 2024-03-28 DIAGNOSIS — Z34.92 PRENATAL CARE IN SECOND TRIMESTER: Primary | ICD-10-CM

## 2024-03-28 DIAGNOSIS — Z86.16 HISTORY OF COVID-19: ICD-10-CM

## 2024-03-28 PROCEDURE — 0502F SUBSEQUENT PRENATAL CARE: CPT | Performed by: OBSTETRICS & GYNECOLOGY

## 2024-03-28 NOTE — PROGRESS NOTES
29 y.o.  at 20w3d EGA Estimated Date of Delivery: 24 here for MADIE:     Pt seen and examined. No concerns/complaints.  She does report that she is currently on Keflex due to a sinus infection. Denies VB, LOF, CTX. Endorses (+) FM. Denies fevers / chills / chest pain / shortness of breath.   Denies HA, changes with vision, RUQ pain, edema.     Objective:  LMP 10/30/2023 (Exact Date)   Gen: AO, NAD  Abd: Soft, NT, gravid   Ext: Mild LE edema    OBSTETRIC ULTRASOUND -- SECOND TRIMESTER     DATE:  2024     PHYSICIAN: DIGNA Guillaume D.O.     SONOGRAPHER: SHAWN Young [facility]     INDICATION:  Second trimester, Anatomical screening     TYPE OF SCAN: vaginal, abdominal 3.5 MHz 5MHz     FINDINGS:       A single viable intrauterine pregnancy is noted in cephalic presentation. Cardiac and somatic activity are noted.     The following values were obtained:              Fetal heart rate                                               143bpm              BPD                                                                 5.04cm                        81.1 %              Head Circumference                                       19.12cm                      81.7 %               Abdominal Circumference                              16.57cm                      80.4 %              Femur Length                                                  3.60cm                        75.4 %              Humerus Length                                             3.24cm                        67.7 %              Cerebellum                                                      2.31cm                        93.3 %              Amniotic fluid DVP                                          3.85cm              EFW                                                                428g                92.2 percentile     Subjective amniotic fluid volume is normal. Based on sonographic criteria, the estimated fetal age is 21weeks and 2days with EDC of

## 2024-04-15 ENCOUNTER — OFFICE VISIT (OUTPATIENT)
Dept: PRIMARY CARE CLINIC | Age: 29
End: 2024-04-15
Payer: COMMERCIAL

## 2024-04-15 VITALS
OXYGEN SATURATION: 97 % | BODY MASS INDEX: 28.99 KG/M2 | HEIGHT: 65 IN | WEIGHT: 174 LBS | HEART RATE: 88 BPM | DIASTOLIC BLOOD PRESSURE: 74 MMHG | TEMPERATURE: 98.2 F | SYSTOLIC BLOOD PRESSURE: 116 MMHG

## 2024-04-15 DIAGNOSIS — H65.03 NON-RECURRENT ACUTE SEROUS OTITIS MEDIA OF BOTH EARS: ICD-10-CM

## 2024-04-15 DIAGNOSIS — J06.9 VIRAL URI: Primary | ICD-10-CM

## 2024-04-15 PROCEDURE — 99213 OFFICE O/P EST LOW 20 MIN: CPT

## 2024-04-15 RX ORDER — CETIRIZINE HYDROCHLORIDE 10 MG/1
10 TABLET ORAL DAILY
Qty: 30 TABLET | Refills: 2 | Status: SHIPPED | OUTPATIENT
Start: 2024-04-15

## 2024-04-15 RX ORDER — FLUTICASONE PROPIONATE 50 MCG
2 SPRAY, SUSPENSION (ML) NASAL DAILY
Qty: 48 G | Refills: 1 | Status: SHIPPED | OUTPATIENT
Start: 2024-04-15

## 2024-04-15 NOTE — PROGRESS NOTES
Vivian An   YOB: 1995    Date of Visit:  4/15/2024     Allergies   Allergen Reactions    Amoxil [Amoxicillin]      Only highest MG dose of Amoxicillin - caused left ring finger to swell up and turn red       Outpatient Medications Marked as Taking for the 4/15/24 encounter (Office Visit) with Amber Leone DO   Medication Sig Dispense Refill    cetirizine (ZYRTEC) 10 MG tablet Take 1 tablet by mouth daily 30 tablet 2    fluticasone (FLONASE) 50 MCG/ACT nasal spray 2 sprays by Each Nostril route daily 48 g 1    Prenatal MV-Min-Fe Fum-FA-DHA (PRENATAL 1 PO) Take by mouth         Wt Readings from Last 3 Encounters:   04/15/24 78.9 kg (174 lb)   03/28/24 76 kg (167 lb 9.6 oz)   02/29/24 72.1 kg (159 lb)     BP Readings from Last 3 Encounters:   04/15/24 116/74   03/28/24 126/84   03/23/24 114/79       HPI: Pt is 23 weeks pregnant. Reports she had COVID mid February, lingered until mid-March. Went to urgent care then and got keflex to help clear up lingering infection, this did help. Woke up Thursday last week with scratchy throat. This has progressed to include coughing up mucus and L ear feeling full today. No drainage from ear. Does have runny nose, post-nasal drip, and hoarseness. No fevers. Used Claritin-D once over the weekend. Does have hx of seasonal allergies at home in [place] which she largely grew out of but this is first spring here. Usually manifests with runny eyes/congestion. Never smoker, no asthma history.     ROS:  As documented in HPI    PHYSICAL EXAM:  Vitals:    04/15/24 1345   BP: 116/74   Site: Right Upper Arm   Position: Sitting   Cuff Size: Medium Adult   Pulse: 88   Temp: 98.2 °F (36.8 °C)   TempSrc: Oral   SpO2: 97%   Weight: 78.9 kg (174 lb)   Height: 1.651 m (5' 5\")     General:  appears well-developed and well-nourished, in no apparent distress  Skin:  normal   Eyes:  normal    ENT:  bilateral tympanic membrane abnormal- bilateral serous effusions, nasal mucosa congested

## 2024-04-24 ENCOUNTER — ROUTINE PRENATAL (OUTPATIENT)
Dept: OBGYN CLINIC | Age: 29
End: 2024-04-24

## 2024-04-24 VITALS
SYSTOLIC BLOOD PRESSURE: 105 MMHG | WEIGHT: 175 LBS | TEMPERATURE: 97.9 F | HEART RATE: 104 BPM | BODY MASS INDEX: 29.12 KG/M2 | DIASTOLIC BLOOD PRESSURE: 65 MMHG

## 2024-04-24 DIAGNOSIS — Z34.92 PRENATAL CARE IN SECOND TRIMESTER: Primary | ICD-10-CM

## 2024-04-24 DIAGNOSIS — Z86.16 HISTORY OF COVID-19: ICD-10-CM

## 2024-04-24 PROCEDURE — 0502F SUBSEQUENT PRENATAL CARE: CPT | Performed by: OBSTETRICS & GYNECOLOGY

## 2024-04-24 NOTE — PROGRESS NOTES
29 y.o.  at 24w2d EGA Estimated Date of Delivery: 24 here for MADIE:     Pt seen and examined. No concerns/complaints.  Denies VB, LOF, CTX. Endorses (+) FM. Denies fevers / chills / chest pain / shortness of breath.   Denies HA, changes with vision, RUQ pain, edema.   MWQ reviewed.     Objective:  /65   Pulse (!) 104   Temp 97.9 °F (36.6 °C) (Infrared)   Wt 79.4 kg (175 lb)   LMP 10/30/2023 (Exact Date)   BMI 29.12 kg/m²   Gen: AO, NAD  Abd: Soft, NT, gravid   Ext: Mild LE edema    Assessment/Plan:   Diagnosis Orders   1. Prenatal care in second trimester        2. History of COVID-19            Diagnosis Orders   1. Prenatal care in second trimester     Reassuring fetal / maternal status today  Aneuploidy / Carrier Screen: neg female     AFP: declined  PNL: B+/-, Hep B/C neg, HIV neg, Rub Nonimmune, Varicella nonimmune, syph nr, Justin/chla neg, Urine culture negative.   Anatomy: 3- will refer to [facility] for completion. Scheduled tomorrow   28 wk: GCT  Hgb Plt next visit   Tdap: at 28 wks    GBS: 35-37 wks   Birth Plan:    Breastfeeding Education:   RTC in 4 weeks    2. History of COVID-19     - Plan for serial growth US.      - reassuring maternal / fetal status     Follow Up  Return OB precautions reviewed   No follow-ups on file.    Approximately 10 minutes spent in room counseling patient on condition and coordination of care with over 50% in direct face to face counseling.     Mahogany Guillaume DO

## 2024-05-22 ENCOUNTER — ROUTINE PRENATAL (OUTPATIENT)
Dept: OBGYN CLINIC | Age: 29
End: 2024-05-22
Payer: COMMERCIAL

## 2024-05-22 VITALS
WEIGHT: 184 LBS | DIASTOLIC BLOOD PRESSURE: 70 MMHG | TEMPERATURE: 98 F | BODY MASS INDEX: 30.62 KG/M2 | HEART RATE: 89 BPM | SYSTOLIC BLOOD PRESSURE: 115 MMHG

## 2024-05-22 DIAGNOSIS — Z23 NEED FOR PERTUSSIS VACCINATION: ICD-10-CM

## 2024-05-22 DIAGNOSIS — Z34.93 PRENATAL CARE IN THIRD TRIMESTER: Primary | ICD-10-CM

## 2024-05-22 DIAGNOSIS — Z86.16 HISTORY OF COVID-19: ICD-10-CM

## 2024-05-22 PROCEDURE — 90471 IMMUNIZATION ADMIN: CPT | Performed by: OBSTETRICS & GYNECOLOGY

## 2024-05-22 PROCEDURE — 36415 COLL VENOUS BLD VENIPUNCTURE: CPT | Performed by: OBSTETRICS & GYNECOLOGY

## 2024-05-22 PROCEDURE — 0502F SUBSEQUENT PRENATAL CARE: CPT | Performed by: OBSTETRICS & GYNECOLOGY

## 2024-05-22 PROCEDURE — 90715 TDAP VACCINE 7 YRS/> IM: CPT | Performed by: OBSTETRICS & GYNECOLOGY

## 2024-05-22 NOTE — PROGRESS NOTES
10:43 AM Given Tdap (Adacel) vaccine 0.5mL IM  Site:Right deltoid.   Lot # [id number]  Expiration Date: 8/8/26  NDC #82781-032-68  Patient tolerated well. No reaction noted after 20 minutes. VIS sheet provided.  Administered by: Brittny Gutiérrez LPN

## 2024-05-22 NOTE — PROGRESS NOTES
29 y.o.  at 28w2d EGA Estimated Date of Delivery: 24 here for MADIE:     Pt seen and examined. She reports some itching on her lower abdomen as well as thighs and possible rash that started approximately one month ago.   Denies VB, LOF, CTX. Endorses (+) FM. Denies fevers / chills / chest pain / shortness of breath.   Denies HA, changes with vision, RUQ pain, edema.     Objective:  /70   Pulse 89   Temp 98 °F (36.7 °C) (Infrared)   Wt 83.5 kg (184 lb)   LMP 10/30/2023 (Exact Date)   BMI 30.62 kg/m²   Gen: AO, NAD  Abd: Soft, NT, gravid   Ext: Mild LE edema    Assessment/Plan:   Diagnosis Orders   1. Prenatal care in third trimester  Glucose Challenge Gestational    CBC with Auto Differential      2. History of COVID-19        3. Need for pertussis vaccination  Tdap, BOOSTRIX, (age 10 yrs+), IM          Diagnosis Orders   1. Prenatal care in third trimester  Glucose Challenge Gestational    CBC with Auto Differential   Reassuring fetal / maternal status today  Aneuploidy / Carrier Screen: neg female     AFP: declined  PNL: B+/-, Hep B/C neg, HIV neg, Rub Nonimmune, Varicella nonimmune, syph nr, Justin/chla neg, Urine culture negative.   Anatomy: 3- will refer to [facility] for completion. Scheduled tomorrow   28 wk: GCT  Hgb Plt sent today  Tdap: 24  GBS: 35-37 wks   Birth Plan:    Breastfeeding Education:   RTC in 2 weeks with growth secondary to COVID infection   Encouraged hydrocortisone for itching.    2. History of COVID-19        3. Need for pertussis vaccination  Tdap, BOOSTRIX, (age 10 yrs+), IM        - reassuring maternal / fetal status     Follow Up  Return OB precautions reviewed   Return in about 2 weeks (around 2024).    Approximately 10 minutes spent in room counseling patient on condition and coordination of care with over 50% in direct face to face counseling.     Mahogany Guillaume DO

## 2024-05-23 LAB
BASOPHILS # BLD: 0 K/UL (ref 0–0.2)
BASOPHILS NFR BLD: 0 %
DEPRECATED RDW RBC AUTO: 14.2 % (ref 12.4–15.4)
EOSINOPHIL # BLD: 0.1 K/UL (ref 0–0.6)
EOSINOPHIL NFR BLD: 1 %
GLUCOSE 1H P 50 G GLC PO SERPL-MCNC: 175 MG/DL
HCT VFR BLD AUTO: 35.3 % (ref 36–48)
HGB BLD-MCNC: 11.9 G/DL (ref 12–16)
LYMPHOCYTES # BLD: 1.1 K/UL (ref 1–5.1)
LYMPHOCYTES NFR BLD: 10 %
MCH RBC QN AUTO: 32.4 PG (ref 26–34)
MCHC RBC AUTO-ENTMCNC: 33.5 G/DL (ref 31–36)
MCV RBC AUTO: 96.5 FL (ref 80–100)
METAMYELOCYTES NFR BLD MANUAL: 1 %
MONOCYTES # BLD: 0.5 K/UL (ref 0–1.3)
MONOCYTES NFR BLD: 5 %
NEUTROPHILS # BLD: 8.8 K/UL (ref 1.7–7.7)
NEUTROPHILS NFR BLD: 74 %
NEUTS BAND NFR BLD MANUAL: 9 % (ref 0–7)
PLATELET # BLD AUTO: 227 K/UL (ref 135–450)
PLATELET BLD QL SMEAR: ADEQUATE
PMV BLD AUTO: 9.3 FL (ref 5–10.5)
RBC # BLD AUTO: 3.66 M/UL (ref 4–5.2)
SLIDE REVIEW: ABNORMAL
WBC # BLD AUTO: 10.5 K/UL (ref 4–11)

## 2024-06-07 ENCOUNTER — ROUTINE PRENATAL (OUTPATIENT)
Dept: OBGYN CLINIC | Age: 29
End: 2024-06-07
Payer: COMMERCIAL

## 2024-06-07 VITALS
TEMPERATURE: 97.5 F | DIASTOLIC BLOOD PRESSURE: 78 MMHG | HEART RATE: 101 BPM | SYSTOLIC BLOOD PRESSURE: 115 MMHG | BODY MASS INDEX: 30.62 KG/M2 | WEIGHT: 184 LBS

## 2024-06-07 DIAGNOSIS — Z86.16 HISTORY OF COVID-19: ICD-10-CM

## 2024-06-07 DIAGNOSIS — O99.810 GLUCOSE INTOLERANCE OF PREGNANCY: ICD-10-CM

## 2024-06-07 DIAGNOSIS — Z34.93 PRENATAL CARE IN THIRD TRIMESTER: Primary | ICD-10-CM

## 2024-06-07 PROCEDURE — 36415 COLL VENOUS BLD VENIPUNCTURE: CPT | Performed by: OBSTETRICS & GYNECOLOGY

## 2024-06-07 PROCEDURE — 0502F SUBSEQUENT PRENATAL CARE: CPT | Performed by: OBSTETRICS & GYNECOLOGY

## 2024-06-07 RX ORDER — HYDROCORTISONE ACETATE 25 MG/1
25 SUPPOSITORY RECTAL EVERY 12 HOURS
Qty: 15 SUPPOSITORY | Refills: 0 | Status: SHIPPED | OUTPATIENT
Start: 2024-06-07

## 2024-06-07 NOTE — PROGRESS NOTES
29 y.o.  at 30w4d EGA Estimated Date of Delivery: 24 here for MADIE:     Pt seen and examined. No concerns/complaints.  She denies any issues with itching. Denies VB, LOF, CTX. Endorses (+) FM. Denies fevers / chills / chest pain / shortness of breath.   Denies HA, changes with vision, RUQ pain, edema.   MWQ reviewed.     Objective:  /78   Pulse (!) 101   Temp 97.5 °F (36.4 °C) (Infrared)   Wt 83.5 kg (184 lb)   LMP 10/30/2023 (Exact Date)   BMI 30.62 kg/m²   Gen: AO, NAD  Abd: Soft, NT, gravid   Ext: Mild LE edema    OBSTETRICAL ULTRASOUND GROWTH     DATE: 2024     PHYSICIAN: DIGNA Guillaume D.O.      SONOGRAPHER: SHAWN Young RDMS     INDICATION: Growth,      TYPE OF SCAN: abdominal     FINDINGS:  A single viable intrauterine pregnancy is noted in cephalic presentation. Cardiac and somatic activity are noted.     The following values were obtained:              Fetal heart rate                                               143bpm              BPD                                                                 7.93cm                        76.7 %              Head Circumference                                       30.63cm                      95.5 %               Abdominal Circumference                              30.12cm                      99 %              Femur Length                                                  6.10cm                        67.2 %              Amniotic fluid index                                         15.52cm              EFW                                                                2141g              98.3 percentile     Amniotic fluid volume is normal. Based on sonographic criteria the estimated fetal age is 33weeks and 0days with EDC of 2024. There is a 14 day discordance with the established EDC of 2024.      The patient has a posterior placenta that is adequate distance in relation to the internal cervical os. The evaluation of the lower

## 2024-06-08 LAB
GLUCOSE 1H P 100 G GLC PO SERPL-MCNC: 197 MG/DL
GLUCOSE 2H P 100 G GLC PO SERPL-MCNC: 182 MG/DL
GLUCOSE 3H P 100 G GLC PO SERPL-MCNC: 151 MG/DL
GLUCOSE BS SERPL-MCNC: 86 MG/DL

## 2024-06-10 ENCOUNTER — TELEPHONE (OUTPATIENT)
Dept: OBGYN CLINIC | Age: 29
End: 2024-06-10

## 2024-06-10 NOTE — TELEPHONE ENCOUNTER
Called in supplies. Glucometer, testing strips, lancets and alcohol pads to her pharmacy. Requested that pharmacy run under whatever brand patient's insurance allows.     Referral to D[facility] completed and faxed. DONE

## 2024-06-10 NOTE — TELEPHONE ENCOUNTER
Called patient to discuss results. Please place referral to DAPP program. Also please send testing supplies to [facility] in [place]. Thanks.

## 2024-06-20 ENCOUNTER — ROUTINE PRENATAL (OUTPATIENT)
Dept: OBGYN CLINIC | Age: 29
End: 2024-06-20

## 2024-06-20 VITALS
SYSTOLIC BLOOD PRESSURE: 112 MMHG | HEART RATE: 89 BPM | WEIGHT: 183.2 LBS | DIASTOLIC BLOOD PRESSURE: 70 MMHG | TEMPERATURE: 98 F | BODY MASS INDEX: 30.49 KG/M2

## 2024-06-20 DIAGNOSIS — O24.410 DIET CONTROLLED GESTATIONAL DIABETES MELLITUS (GDM) IN THIRD TRIMESTER: ICD-10-CM

## 2024-06-20 DIAGNOSIS — O26.843 UTERINE SIZE-DATE DISCREPANCY IN THIRD TRIMESTER: ICD-10-CM

## 2024-06-20 DIAGNOSIS — Z34.93 PRENATAL CARE IN THIRD TRIMESTER: Primary | ICD-10-CM

## 2024-06-20 DIAGNOSIS — Z86.16 HISTORY OF COVID-19: ICD-10-CM

## 2024-06-22 PROBLEM — O26.843 UTERINE SIZE-DATE DISCREPANCY IN THIRD TRIMESTER: Status: ACTIVE | Noted: 2024-06-22

## 2024-06-22 PROBLEM — O24.410 DIET CONTROLLED GESTATIONAL DIABETES MELLITUS (GDM) IN THIRD TRIMESTER: Status: ACTIVE | Noted: 2024-06-22

## 2024-06-22 PROBLEM — Z34.93 PRENATAL CARE IN THIRD TRIMESTER: Status: ACTIVE | Noted: 2024-02-29

## 2024-06-24 ENCOUNTER — ROUTINE PRENATAL (OUTPATIENT)
Dept: OBGYN CLINIC | Age: 29
End: 2024-06-24

## 2024-06-24 VITALS
WEIGHT: 183 LBS | DIASTOLIC BLOOD PRESSURE: 82 MMHG | OXYGEN SATURATION: 98 % | HEIGHT: 65 IN | BODY MASS INDEX: 30.49 KG/M2 | HEART RATE: 88 BPM | SYSTOLIC BLOOD PRESSURE: 117 MMHG | TEMPERATURE: 97.6 F

## 2024-06-24 DIAGNOSIS — Z34.93 PRENATAL CARE IN THIRD TRIMESTER: Primary | ICD-10-CM

## 2024-06-24 DIAGNOSIS — O24.410 DIET CONTROLLED GESTATIONAL DIABETES MELLITUS (GDM) IN THIRD TRIMESTER: ICD-10-CM

## 2024-06-24 DIAGNOSIS — O09.93 HIGH-RISK PREGNANCY IN THIRD TRIMESTER: ICD-10-CM

## 2024-06-24 DIAGNOSIS — Z86.16 HISTORY OF COVID-19: ICD-10-CM

## 2024-06-24 PROCEDURE — 0502F SUBSEQUENT PRENATAL CARE: CPT | Performed by: OBSTETRICS & GYNECOLOGY

## 2024-06-24 NOTE — PROGRESS NOTES
Return OB Office Visit    CC:   Chief Complaint   Patient presents with    Pregnancy Ultrasound    Routine Prenatal Visit       HPI:  Pt seen and examined. No concerns/complaints. Denies VB, LOF, ctx. +FM.    GDM  - fastin, 103, 99, 99  - 1hr PP: 192, 136, 166, 120, 118, 161, 108, 125, 177  - qHS: 122, 130, 106  Has an appointment with DAPP on Friday    Maternal wellness questionnaire reviewed - no concerns today. Score 14.     Objective:  /82   Pulse 88   Temp 97.6 °F (36.4 °C) (Temporal)   Ht 1.651 m (5' 5\")   Wt 83 kg (183 lb)   LMP 10/30/2023 (Exact Date)   SpO2 98%   BMI 30.45 kg/m²   Gen: AO, NAD  Abd: Soft, NT  FHT: 132    Assessment/Plan:  29 y.o.  at 33w0d (Estimated Date of Delivery: 24) presents for MADIE appointment:      Diagnosis Orders   1. Prenatal care in third trimester        2. High-risk pregnancy in third trimester        3. History of COVID-19        4. Diet controlled gestational diabetes mellitus (GDM) in third trimester          Doing well today, routine care  - FWB reassuring on US today, BPP 8/8  - BG as above with persistently elevated readings, we reviewed dietary changes, watching carbs and adding bedtime snack for AM elevations. Has f/u with DAPP on 24. Also reviewed possible need for insulin if persistently elevated, questionss answered  - q4wk growth US    Dispo: RTC in 1 week, DAPP on 24  Bria Blackmon MD

## 2024-07-02 ENCOUNTER — ROUTINE PRENATAL (OUTPATIENT)
Dept: OBGYN CLINIC | Age: 29
End: 2024-07-02

## 2024-07-02 VITALS
DIASTOLIC BLOOD PRESSURE: 76 MMHG | WEIGHT: 182.6 LBS | HEART RATE: 78 BPM | BODY MASS INDEX: 30.39 KG/M2 | TEMPERATURE: 97.8 F | SYSTOLIC BLOOD PRESSURE: 124 MMHG

## 2024-07-02 DIAGNOSIS — O24.410 DIET CONTROLLED GESTATIONAL DIABETES MELLITUS (GDM) IN THIRD TRIMESTER: ICD-10-CM

## 2024-07-02 DIAGNOSIS — O26.843 UTERINE SIZE-DATE DISCREPANCY IN THIRD TRIMESTER: ICD-10-CM

## 2024-07-02 DIAGNOSIS — Z34.93 PRENATAL CARE IN THIRD TRIMESTER: Primary | ICD-10-CM

## 2024-07-02 PROCEDURE — 0502F SUBSEQUENT PRENATAL CARE: CPT | Performed by: OBSTETRICS & GYNECOLOGY

## 2024-07-02 NOTE — PROGRESS NOTES
30 y/o  female at 34 weeks 1 day gestation with EDC 24 presents for prenatal visit and ultrasound.    Patient is established with Dr. Guillaume.   Pregnancy is complicated by large for gestational age, gestational diabetes (data collecting in progress, has follow up  appointment with DAPP program on 24), and maternal weight.   Glucose levels:  Fastin-106  1 hour postprandial breakfast:127-154  1 hour postprandial lunch: 119-146  1 hour postprandial dinner: 117-122  Glucose levels still elevated--considering insulin therapy.   Denies vaginal bleeding, loss of fluid, contractions and decreased fetal movement.  Admits to mild chest pain when lying on let side and cramping.    Denies headaches, vision changes, and RUQ pain.   Denies fever, chills, shortness of breath, nausea, vomiting, diarrhea, constipation, dysuria and hematuria.   Maternal Wellness Questionnaire reviewed--no concerns.     24:  g, 92.2%  24: EFW: 2141 g, 98.3%  24: EFW 2828 g, 90.6%    OBSTETRIC ULTRASOUND GROWTH     DATE: 2024     PHYSICIAN: SHAWN Gastelum D.O.      SONOGRAPHER: SHAWN Young [facility]     INDICATION: Growth, BPP     TYPE OF SCAN: abdominal     FINDINGS:  A single viable intrauterine pregnancy is noted in cephalic presentation. Cardiac and somatic activity are noted.     The following values were obtained:              Fetal heart rate                                               152bpm              BPD                                                                 8.82cm                        86.4 %              Head Circumference                                       32.56cm                      82.1 %               Abdominal Circumference                              31.73cm                      89.7 %              Femur Length                                                  7.13cm                        92.1 %              Amniotic fluid index                                         14.26cm

## 2024-07-09 ENCOUNTER — TELEPHONE (OUTPATIENT)
Dept: OBGYN CLINIC | Age: 29
End: 2024-07-09

## 2024-07-09 NOTE — TELEPHONE ENCOUNTER
Patient is requesting to switch providers from Dr. Guillaume to Dr. Gastelum following her pregnancy. Sending message to providers as MIKKI

## 2024-07-11 ENCOUNTER — ROUTINE PRENATAL (OUTPATIENT)
Dept: OBGYN CLINIC | Age: 29
End: 2024-07-11

## 2024-07-11 VITALS
HEART RATE: 101 BPM | SYSTOLIC BLOOD PRESSURE: 105 MMHG | WEIGHT: 182.6 LBS | BODY MASS INDEX: 30.39 KG/M2 | DIASTOLIC BLOOD PRESSURE: 80 MMHG

## 2024-07-11 DIAGNOSIS — O26.843 UTERINE SIZE-DATE DISCREPANCY IN THIRD TRIMESTER: ICD-10-CM

## 2024-07-11 DIAGNOSIS — O24.414 INSULIN CONTROLLED GESTATIONAL DIABETES MELLITUS (GDM) IN THIRD TRIMESTER: ICD-10-CM

## 2024-07-11 DIAGNOSIS — Z34.93 PRENATAL CARE IN THIRD TRIMESTER: Primary | ICD-10-CM

## 2024-07-11 PROCEDURE — 0502F SUBSEQUENT PRENATAL CARE: CPT | Performed by: OBSTETRICS & GYNECOLOGY

## 2024-07-15 ENCOUNTER — ROUTINE PRENATAL (OUTPATIENT)
Dept: OBGYN CLINIC | Age: 29
End: 2024-07-15

## 2024-07-15 VITALS
SYSTOLIC BLOOD PRESSURE: 120 MMHG | HEART RATE: 93 BPM | BODY MASS INDEX: 30.52 KG/M2 | WEIGHT: 183.4 LBS | TEMPERATURE: 98.3 F | DIASTOLIC BLOOD PRESSURE: 80 MMHG

## 2024-07-15 DIAGNOSIS — Z86.16 HISTORY OF COVID-19: ICD-10-CM

## 2024-07-15 DIAGNOSIS — O24.410 DIET CONTROLLED GESTATIONAL DIABETES MELLITUS (GDM) IN THIRD TRIMESTER: ICD-10-CM

## 2024-07-15 DIAGNOSIS — Z34.93 PRENATAL CARE IN THIRD TRIMESTER: Primary | ICD-10-CM

## 2024-07-15 DIAGNOSIS — O26.843 UTERINE SIZE-DATE DISCREPANCY IN THIRD TRIMESTER: ICD-10-CM

## 2024-07-15 DIAGNOSIS — O09.93 HIGH-RISK PREGNANCY IN THIRD TRIMESTER: ICD-10-CM

## 2024-07-15 PROCEDURE — 0502F SUBSEQUENT PRENATAL CARE: CPT | Performed by: OBSTETRICS & GYNECOLOGY

## 2024-07-15 NOTE — PROGRESS NOTES
29 y.o.  at 37w0d EGA Estimated Date of Delivery: 24 here for MADIE:     Pt seen and examined. No concerns/complaints.  Denies VB, LOF, CTX. Endorses (+) FM.   Denies fevers / chills / chest pain / shortness of breath.   Denies HA, changes with vision, RUQ pain, edema.   MWQ reviewed.     Pregnancy is complicated by large for gestational age, gestational diabetes (data collecting in progress, has follow up  appointment with DAPP program on 24), and maternal weight.    Glucose levels: did not bring in to review. Fasting highest 93. Highest Post Prandial 188     Denies vaginal bleeding, loss of fluid, contractions and decreased fetal movement.  Admits to mild chest pain when lying on let side and cramping.    Denies headaches, vision changes, and RUQ pain.   Denies fever, chills, shortness of breath, nausea, vomiting, diarrhea, constipation, dysuria and hematuria.   Maternal Wellness Questionnaire reviewed--no concerns.     24:  g, 92.2%  24: EFW: 2141 g, 98.3%  24: EFW 2828 g, 90.6%    Per UC;  IUP at 36w2d by LMP equal to our first scan (TANIYA by our records is 2024).  GDMA2 on insulin. She continues to high post breakfast, and does not want to start log. We will start some acarbose, 25 mg, just with breakfast.  Fetus with normal growth. The abdominal circumference is >99th percentile today, but the EFW is 3540 gm. I reviewed the concern of macrosomia with her today including the risk of shoulder dystocia. I do not believe our estimation will put her at over 4500 gm to offer a  for the indication of risk of shoulder dystocia, however we can recheck growth at 38 weeks to get an updated estimation. She did ask about whether she could have a  section for maternal preference, which is reasonable and I reviewed increased risks of  with infection, bleeding and especially with subsequent pregnancies. She has been getting once weekly testing, but now that she is on

## 2024-07-18 ENCOUNTER — ROUTINE PRENATAL (OUTPATIENT)
Dept: OBGYN CLINIC | Age: 29
End: 2024-07-18

## 2024-07-18 VITALS
BODY MASS INDEX: 30.46 KG/M2 | SYSTOLIC BLOOD PRESSURE: 126 MMHG | WEIGHT: 182.8 LBS | OXYGEN SATURATION: 96 % | DIASTOLIC BLOOD PRESSURE: 83 MMHG | HEART RATE: 83 BPM | HEIGHT: 65 IN | TEMPERATURE: 98 F

## 2024-07-18 DIAGNOSIS — O09.93 HIGH-RISK PREGNANCY IN THIRD TRIMESTER: ICD-10-CM

## 2024-07-18 DIAGNOSIS — Z34.93 PRENATAL CARE IN THIRD TRIMESTER: Primary | ICD-10-CM

## 2024-07-18 DIAGNOSIS — O26.843 UTERINE SIZE-DATE DISCREPANCY IN THIRD TRIMESTER: ICD-10-CM

## 2024-07-18 DIAGNOSIS — O24.414 INSULIN CONTROLLED GESTATIONAL DIABETES MELLITUS (GDM) IN THIRD TRIMESTER: ICD-10-CM

## 2024-07-18 PROBLEM — O24.410 DIET CONTROLLED GESTATIONAL DIABETES MELLITUS (GDM) IN THIRD TRIMESTER: Status: RESOLVED | Noted: 2024-06-22 | Resolved: 2024-07-18

## 2024-07-18 LAB
GP B STREP SPEC QL CULT: ABNORMAL
ORGANISM: ABNORMAL

## 2024-07-18 NOTE — PROGRESS NOTES
Return OB Office Visit    CC:   Chief Complaint   Patient presents with    Routine Prenatal Visit       HPI:  Pt seen and examined. No concerns/complaints. Denies VB, LOF. Some mohamud-esquivel contractions. +FM.     Seeing DAPP program -- checking BG logs, next appt with them on 24  - NPH 10 in AM, 5 at bedtime  - acarbose 25mg in AM as well     Maternal wellness questionnaire reviewed - no concerns today.     Objective:  /83   Pulse 83   Temp 98 °F (36.7 °C) (Temporal)   Ht 1.651 m (5' 5\")   Wt 82.9 kg (182 lb 12.8 oz)   LMP 10/30/2023 (Exact Date)   SpO2 96%   BMI 30.42 kg/m²   Gen: AO, NAD  Abd: Soft, NT  FHT: 140    Assessment/Plan:  29 y.o.  at 37w3d (Estimated Date of Delivery: 24) presents for MADIE appointment:      Diagnosis Orders   1. Prenatal care in third trimester        2. Uterine size-date discrepancy in third trimester        3. Insulin controlled gestational diabetes mellitus (GDM) in third trimester  30835 - MI FETAL NON-STRESS TEST      4. High-risk pregnancy in third trimester          Doing well today, routine care  - seeing DAPP program for GDM, now on insulin and oral meds  - q4wk growth US, 2x/week ANFS -- NST today reassuring  - delivery planning pending glucose control, likely 39 weeks, will need to schedule IOL at subsequent visit  - plan for growth US at next visit (at 3 weeks) to assess for route of delivery as pt is concerned for implications of macrosomia with GDM and possible need for  delivery. Will readdress after growth US to discuss recommendations    Dispo: RTC in 3-4 days  Bria Blackmon MD

## 2024-07-23 ENCOUNTER — ROUTINE PRENATAL (OUTPATIENT)
Dept: OBGYN CLINIC | Age: 29
End: 2024-07-23

## 2024-07-23 VITALS
DIASTOLIC BLOOD PRESSURE: 78 MMHG | BODY MASS INDEX: 30.15 KG/M2 | SYSTOLIC BLOOD PRESSURE: 133 MMHG | WEIGHT: 181.2 LBS | HEART RATE: 82 BPM

## 2024-07-23 DIAGNOSIS — B95.1 POSITIVE GBS TEST: ICD-10-CM

## 2024-07-23 DIAGNOSIS — O36.63X0 FETAL MACROSOMIA DURING PREGNANCY IN THIRD TRIMESTER, SINGLE OR UNSPECIFIED FETUS: ICD-10-CM

## 2024-07-23 DIAGNOSIS — O26.843 UTERINE SIZE-DATE DISCREPANCY IN THIRD TRIMESTER: ICD-10-CM

## 2024-07-23 DIAGNOSIS — Z34.93 PRENATAL CARE IN THIRD TRIMESTER: Primary | ICD-10-CM

## 2024-07-23 DIAGNOSIS — O24.414 INSULIN CONTROLLED GESTATIONAL DIABETES MELLITUS (GDM) IN THIRD TRIMESTER: ICD-10-CM

## 2024-07-23 DIAGNOSIS — Z86.16 HISTORY OF COVID-19: ICD-10-CM

## 2024-07-23 NOTE — PROGRESS NOTES
Return OB Office Visit    CC:   Chief Complaint   Patient presents with    Routine Prenatal Visit       HPI:  Patient seen and examined. Doing well today without complaints.     Pregnancy is complicated by large for gestational age, gestational diabetes (sees DAPP program), uterine size-dates discrepancy, hx of COVID-19, GBS positive testing and maternal weight.     Reports on Sunday she went for a walk and after she got home she had some nausea, cramping and was lightheaded.      Denies VB, LOF, ctx. +FM.  Denies headaches, vision changes, RUQ pain, increased LE edema.  Denies chest pain, shortness of breath, fever, chills, nausea, vomiting.      Is following with DAPP program.  Does not have logs today, states fasting this AM was 94.  Current regimen is NPH 10 units in AM and 5 units at bedtime with Acarbose 25mg prior to each meal (is not taking with lighter meals).    Review of Systems: The following ROS was otherwise negative, except as noted in the HPI: constitutional, HEENT, respiratory, cardiovascular, gastrointestinal, genitourinary, skin, musculoskeletal, neurological, psych    Objective:  /78   Pulse 82   Wt 82.2 kg (181 lb 3.2 oz)   LMP 10/30/2023 (Exact Date)   BMI 30.15 kg/m²     Physical Exam  Vitals reviewed.   Constitutional:       General: She is not in acute distress.     Appearance: She is well-developed.   HENT:      Head: Normocephalic and atraumatic.   Eyes:      Conjunctiva/sclera: Conjunctivae normal.   Cardiovascular:      Rate and Rhythm: Normal rate.   Pulmonary:      Effort: Pulmonary effort is normal. No respiratory distress.   Abdominal:      General: There is no distension.      Palpations: Abdomen is soft.      Tenderness: There is no abdominal tenderness. There is no guarding or rebound.   Genitourinary:     General: Normal vulva.      Vagina: No vaginal discharge.   Musculoskeletal:         General: No swelling.   Skin:     General: Skin is warm and dry.   Neurological:

## 2024-07-24 NOTE — PROGRESS NOTES
28th  Following with DAPP program.   Twice weekly ANFS  Growth scan every 4 weeks  Follow  up prn and 3-4 days for prenatal visit and fetal monitoring.

## 2024-07-26 ENCOUNTER — ROUTINE PRENATAL (OUTPATIENT)
Dept: OBGYN CLINIC | Age: 29
End: 2024-07-26

## 2024-07-26 VITALS
SYSTOLIC BLOOD PRESSURE: 128 MMHG | TEMPERATURE: 98 F | DIASTOLIC BLOOD PRESSURE: 64 MMHG | WEIGHT: 181.8 LBS | BODY MASS INDEX: 30.25 KG/M2 | HEART RATE: 80 BPM

## 2024-07-26 DIAGNOSIS — B95.1 POSITIVE GBS TEST: ICD-10-CM

## 2024-07-26 DIAGNOSIS — O36.63X0 FETAL MACROSOMIA DURING PREGNANCY IN THIRD TRIMESTER, SINGLE OR UNSPECIFIED FETUS: ICD-10-CM

## 2024-07-26 DIAGNOSIS — Z86.16 HISTORY OF COVID-19: ICD-10-CM

## 2024-07-26 DIAGNOSIS — Z34.93 PRENATAL CARE IN THIRD TRIMESTER: Primary | ICD-10-CM

## 2024-07-26 DIAGNOSIS — O24.414 INSULIN CONTROLLED GESTATIONAL DIABETES MELLITUS (GDM) IN THIRD TRIMESTER: ICD-10-CM

## 2024-07-26 DIAGNOSIS — O26.843 UTERINE SIZE-DATE DISCREPANCY IN THIRD TRIMESTER: ICD-10-CM

## 2024-07-28 PROBLEM — O36.63X0 FETAL MACROSOMIA IN PREGNANCY IN THIRD TRIMESTER: Status: ACTIVE | Noted: 2024-07-28

## 2024-07-28 PROBLEM — B95.1 POSITIVE GBS TEST: Status: ACTIVE | Noted: 2024-07-28

## 2024-07-28 PROBLEM — Z3A.08 8 WEEKS GESTATION OF PREGNANCY: Status: RESOLVED | Noted: 2024-01-02 | Resolved: 2024-07-28

## 2024-07-29 ENCOUNTER — ROUTINE PRENATAL (OUTPATIENT)
Dept: OBGYN CLINIC | Age: 29
End: 2024-07-29
Payer: COMMERCIAL

## 2024-07-29 VITALS
SYSTOLIC BLOOD PRESSURE: 120 MMHG | HEART RATE: 88 BPM | BODY MASS INDEX: 30.02 KG/M2 | TEMPERATURE: 97 F | DIASTOLIC BLOOD PRESSURE: 74 MMHG | WEIGHT: 180.4 LBS

## 2024-07-29 DIAGNOSIS — B95.1 POSITIVE GBS TEST: ICD-10-CM

## 2024-07-29 DIAGNOSIS — O24.414 INSULIN CONTROLLED GESTATIONAL DIABETES MELLITUS (GDM) IN THIRD TRIMESTER: ICD-10-CM

## 2024-07-29 DIAGNOSIS — Z34.93 PRENATAL CARE IN THIRD TRIMESTER: ICD-10-CM

## 2024-07-29 DIAGNOSIS — O09.93 HIGH-RISK PREGNANCY IN THIRD TRIMESTER: Primary | ICD-10-CM

## 2024-07-29 DIAGNOSIS — O36.63X0 FETAL MACROSOMIA DURING PREGNANCY IN THIRD TRIMESTER, SINGLE OR UNSPECIFIED FETUS: ICD-10-CM

## 2024-07-29 DIAGNOSIS — Z86.16 HISTORY OF COVID-19: ICD-10-CM

## 2024-07-29 DIAGNOSIS — O26.843 UTERINE SIZE-DATE DISCREPANCY IN THIRD TRIMESTER: ICD-10-CM

## 2024-07-29 PROCEDURE — 59025 FETAL NON-STRESS TEST: CPT | Performed by: OBSTETRICS & GYNECOLOGY

## 2024-07-29 PROCEDURE — 0502F SUBSEQUENT PRENATAL CARE: CPT | Performed by: OBSTETRICS & GYNECOLOGY

## 2024-07-29 NOTE — PROGRESS NOTES
29 y.o.  at 39w0d EGA Estimated Date of Delivery: 24 here for MADIE:     Pt seen and examined. No concerns/complaints.  Denies VB, LOF, CTX. Endorses (+) FM. Denies fevers / chills / chest pain / shortness of breath.   Denies HA, changes with vision, RUQ pain, edema.   MWQ reviewed.     Pregnancy is complicated by large for gestational age, gestational diabetes (data collecting in progress, has follow up  appointment with DAPP program on 24), and maternal weight.    Glucose levels: did not bring in to review. Fasting highest 93. Highest Post Prandial 188     24:  g, 92.2%  24: EFW: 2141 g, 98.3%  24: EFW 2828 g, 90.6%    Per UC;  IUP at 36w2d by LMP equal to our first scan (TANIYA by our records is 2024).  GDMA2 on insulin. She continues to high post breakfast, and does not want to start log. We will start some acarbose, 25 mg, just with breakfast.  Fetus with normal growth. The abdominal circumference is >99th percentile today, but the EFW is 3540 gm. I reviewed the concern of macrosomia with her today including the risk of shoulder dystocia. I do not believe our estimation will put her at over 4500 gm to offer a  for the indication of risk of shoulder dystocia, however we can recheck growth at 38 weeks to get an updated estimation. She did ask about whether she could have a  section for maternal preference, which is reasonable and I reviewed increased risks of  with infection, bleeding and especially with subsequent pregnancies. She has been getting once weekly testing, but now that she is on insulin we recommend twice per week testing.    Objective:  /74 (Site: Right Upper Arm, Position: Sitting, Cuff Size: Medium Adult)   Pulse 88   Temp 97 °F (36.1 °C) (Infrared)   Wt 81.8 kg (180 lb 6.4 oz)   LMP 10/30/2023 (Exact Date)   BMI 30.02 kg/m²   Gen: AO, NAD  Abd: Soft, NT, gravid   Ext: Mild LE edema  OMM: Increased lumbar lordosis    NST   Reactive

## 2024-07-31 ENCOUNTER — ANESTHESIA EVENT (OUTPATIENT)
Dept: LABOR AND DELIVERY | Age: 29
End: 2024-07-31
Payer: COMMERCIAL

## 2024-07-31 ENCOUNTER — APPOINTMENT (OUTPATIENT)
Dept: LABOR AND DELIVERY | Age: 29
End: 2024-07-31
Payer: COMMERCIAL

## 2024-07-31 ENCOUNTER — HOSPITAL ENCOUNTER (INPATIENT)
Age: 29
LOS: 2 days | Discharge: HOME | End: 2024-08-02
Attending: OBSTETRICS & GYNECOLOGY | Admitting: OBSTETRICS & GYNECOLOGY
Payer: COMMERCIAL

## 2024-07-31 ENCOUNTER — ANESTHESIA (OUTPATIENT)
Dept: LABOR AND DELIVERY | Age: 29
End: 2024-07-31
Payer: COMMERCIAL

## 2024-07-31 PROBLEM — Z37.9 NORMAL LABOR: Status: ACTIVE | Noted: 2024-07-31

## 2024-07-31 LAB
ABO + RH BLD: NORMAL
AMPHETAMINES UR QL SCN>1000 NG/ML: NORMAL
BARBITURATES UR QL SCN>200 NG/ML: NORMAL
BASOPHILS # BLD: 0 K/UL (ref 0–0.2)
BASOPHILS NFR BLD: 0.2 %
BENZODIAZ UR QL SCN>200 NG/ML: NORMAL
BLD GP AB SCN SERPL QL: NORMAL
BUPRENORPHINE+NOR UR QL SCN: NORMAL
CANNABINOIDS UR QL SCN>50 NG/ML: NORMAL
COCAINE UR QL SCN: NORMAL
DEPRECATED RDW RBC AUTO: 14.6 % (ref 12.4–15.4)
DRUG SCREEN COMMENT UR-IMP: NORMAL
EOSINOPHIL # BLD: 0.1 K/UL (ref 0–0.6)
EOSINOPHIL NFR BLD: 1.1 %
FENTANYL SCREEN, URINE: NORMAL
GLUCOSE BLD-MCNC: 105 MG/DL (ref 70–99)
GLUCOSE BLD-MCNC: 107 MG/DL (ref 70–99)
GLUCOSE BLD-MCNC: 107 MG/DL (ref 70–99)
GLUCOSE BLD-MCNC: 151 MG/DL (ref 70–99)
GLUCOSE BLD-MCNC: 73 MG/DL (ref 70–99)
GLUCOSE BLD-MCNC: 94 MG/DL (ref 70–99)
GLUCOSE BLD-MCNC: 95 MG/DL (ref 70–99)
GLUCOSE BLD-MCNC: 97 MG/DL (ref 70–99)
HCT VFR BLD AUTO: 42.2 % (ref 36–48)
HGB BLD-MCNC: 14.3 G/DL (ref 12–16)
LYMPHOCYTES # BLD: 1.5 K/UL (ref 1–5.1)
LYMPHOCYTES NFR BLD: 16.8 %
MCH RBC QN AUTO: 32.4 PG (ref 26–34)
MCHC RBC AUTO-ENTMCNC: 34 G/DL (ref 31–36)
MCV RBC AUTO: 95.3 FL (ref 80–100)
METHADONE UR QL SCN>300 NG/ML: NORMAL
MONOCYTES # BLD: 0.6 K/UL (ref 0–1.3)
MONOCYTES NFR BLD: 7 %
NEUTROPHILS # BLD: 6.6 K/UL (ref 1.7–7.7)
NEUTROPHILS NFR BLD: 74.9 %
OPIATES UR QL SCN>300 NG/ML: NORMAL
OXYCODONE UR QL SCN: NORMAL
PCP UR QL SCN>25 NG/ML: NORMAL
PERFORMED ON: ABNORMAL
PERFORMED ON: NORMAL
PH UR STRIP: 7 [PH]
PLATELET # BLD AUTO: 174 K/UL (ref 135–450)
PMV BLD AUTO: 10.1 FL (ref 5–10.5)
RBC # BLD AUTO: 4.43 M/UL (ref 4–5.2)
REAGIN+T PALLIDUM IGG+IGM SERPL-IMP: NORMAL
WBC # BLD AUTO: 8.9 K/UL (ref 4–11)

## 2024-07-31 PROCEDURE — 59400 OBSTETRICAL CARE: CPT | Performed by: OBSTETRICS & GYNECOLOGY

## 2024-07-31 PROCEDURE — 2580000003 HC RX 258: Performed by: OBSTETRICS & GYNECOLOGY

## 2024-07-31 PROCEDURE — 2500000003 HC RX 250 WO HCPCS: Performed by: OBSTETRICS & GYNECOLOGY

## 2024-07-31 PROCEDURE — 7200000001 HC VAGINAL DELIVERY

## 2024-07-31 PROCEDURE — 0HQ9XZZ REPAIR PERINEUM SKIN, EXTERNAL APPROACH: ICD-10-PCS | Performed by: OBSTETRICS & GYNECOLOGY

## 2024-07-31 PROCEDURE — 6360000002 HC RX W HCPCS: Performed by: OBSTETRICS & GYNECOLOGY

## 2024-07-31 PROCEDURE — 51701 INSERT BLADDER CATHETER: CPT

## 2024-07-31 PROCEDURE — 86900 BLOOD TYPING SEROLOGIC ABO: CPT

## 2024-07-31 PROCEDURE — 86850 RBC ANTIBODY SCREEN: CPT

## 2024-07-31 PROCEDURE — 85025 COMPLETE CBC W/AUTO DIFF WBC: CPT

## 2024-07-31 PROCEDURE — 6370000000 HC RX 637 (ALT 250 FOR IP): Performed by: OBSTETRICS & GYNECOLOGY

## 2024-07-31 PROCEDURE — 86780 TREPONEMA PALLIDUM: CPT

## 2024-07-31 PROCEDURE — 2500000003 HC RX 250 WO HCPCS: Performed by: NURSE ANESTHETIST, CERTIFIED REGISTERED

## 2024-07-31 PROCEDURE — 6360000002 HC RX W HCPCS: Performed by: NURSE ANESTHETIST, CERTIFIED REGISTERED

## 2024-07-31 PROCEDURE — 1220000000 HC SEMI PRIVATE OB R&B

## 2024-07-31 PROCEDURE — 86901 BLOOD TYPING SEROLOGIC RH(D): CPT

## 2024-07-31 PROCEDURE — 80307 DRUG TEST PRSMV CHEM ANLYZR: CPT

## 2024-07-31 RX ORDER — SODIUM CHLORIDE 0.9 % (FLUSH) 0.9 %
5-40 SYRINGE (ML) INJECTION EVERY 12 HOURS SCHEDULED
Status: DISCONTINUED | OUTPATIENT
Start: 2024-07-31 | End: 2024-08-02 | Stop reason: HOSPADM

## 2024-07-31 RX ORDER — SODIUM CHLORIDE 0.9 % (FLUSH) 0.9 %
5-40 SYRINGE (ML) INJECTION EVERY 12 HOURS SCHEDULED
Status: DISCONTINUED | OUTPATIENT
Start: 2024-07-31 | End: 2024-07-31

## 2024-07-31 RX ORDER — TERBUTALINE SULFATE 1 MG/ML
0.25 INJECTION, SOLUTION SUBCUTANEOUS
Status: DISCONTINUED | OUTPATIENT
Start: 2024-07-31 | End: 2024-07-31

## 2024-07-31 RX ORDER — ONDANSETRON 4 MG/1
4 TABLET, ORALLY DISINTEGRATING ORAL EVERY 6 HOURS PRN
Status: DISCONTINUED | OUTPATIENT
Start: 2024-07-31 | End: 2024-07-31

## 2024-07-31 RX ORDER — SODIUM CHLORIDE 9 MG/ML
INJECTION, SOLUTION INTRAVENOUS PRN
Status: DISCONTINUED | OUTPATIENT
Start: 2024-07-31 | End: 2024-07-31

## 2024-07-31 RX ORDER — DEXTROSE MONOHYDRATE 50 MG/ML
100 INJECTION, SOLUTION INTRAVENOUS PRN
Status: DISCONTINUED | OUTPATIENT
Start: 2024-07-31 | End: 2024-07-31

## 2024-07-31 RX ORDER — CARBOPROST TROMETHAMINE 250 UG/ML
250 INJECTION, SOLUTION INTRAMUSCULAR PRN
Status: DISCONTINUED | OUTPATIENT
Start: 2024-07-31 | End: 2024-07-31

## 2024-07-31 RX ORDER — SODIUM CHLORIDE 0.9 % (FLUSH) 0.9 %
5-40 SYRINGE (ML) INJECTION PRN
Status: DISCONTINUED | OUTPATIENT
Start: 2024-07-31 | End: 2024-07-31

## 2024-07-31 RX ORDER — SODIUM CHLORIDE, SODIUM LACTATE, POTASSIUM CHLORIDE, AND CALCIUM CHLORIDE .6; .31; .03; .02 G/100ML; G/100ML; G/100ML; G/100ML
500 INJECTION, SOLUTION INTRAVENOUS PRN
Status: DISCONTINUED | OUTPATIENT
Start: 2024-07-31 | End: 2024-07-31

## 2024-07-31 RX ORDER — INSULIN LISPRO 100 [IU]/ML
0-4 INJECTION, SOLUTION INTRAVENOUS; SUBCUTANEOUS NIGHTLY
Status: DISCONTINUED | OUTPATIENT
Start: 2024-07-31 | End: 2024-08-02 | Stop reason: HOSPADM

## 2024-07-31 RX ORDER — GLUCAGON 1 MG/ML
1 KIT INJECTION PRN
Status: DISCONTINUED | OUTPATIENT
Start: 2024-07-31 | End: 2024-07-31

## 2024-07-31 RX ORDER — ONDANSETRON 2 MG/ML
4 INJECTION INTRAMUSCULAR; INTRAVENOUS EVERY 6 HOURS PRN
Status: DISCONTINUED | OUTPATIENT
Start: 2024-07-31 | End: 2024-08-02 | Stop reason: HOSPADM

## 2024-07-31 RX ORDER — LANOLIN 100 %
OINTMENT (GRAM) TOPICAL PRN
Status: DISCONTINUED | OUTPATIENT
Start: 2024-07-31 | End: 2024-08-02 | Stop reason: HOSPADM

## 2024-07-31 RX ORDER — BUPIVACAINE HYDROCHLORIDE 2.5 MG/ML
INJECTION, SOLUTION EPIDURAL; INFILTRATION; INTRACAUDAL
Status: COMPLETED
Start: 2024-07-31 | End: 2024-07-31

## 2024-07-31 RX ORDER — IBUPROFEN 800 MG/1
800 TABLET ORAL EVERY 8 HOURS SCHEDULED
Status: DISCONTINUED | OUTPATIENT
Start: 2024-07-31 | End: 2024-08-02 | Stop reason: HOSPADM

## 2024-07-31 RX ORDER — INSULIN LISPRO 100 [IU]/ML
0-4 INJECTION, SOLUTION INTRAVENOUS; SUBCUTANEOUS PRN
Status: DISCONTINUED | OUTPATIENT
Start: 2024-07-31 | End: 2024-07-31

## 2024-07-31 RX ORDER — ACETAMINOPHEN 500 MG
1000 TABLET ORAL EVERY 8 HOURS SCHEDULED
Status: DISCONTINUED | OUTPATIENT
Start: 2024-07-31 | End: 2024-08-02 | Stop reason: HOSPADM

## 2024-07-31 RX ORDER — ONDANSETRON 4 MG/1
4 TABLET, ORALLY DISINTEGRATING ORAL EVERY 6 HOURS PRN
Status: DISCONTINUED | OUTPATIENT
Start: 2024-07-31 | End: 2024-08-02 | Stop reason: HOSPADM

## 2024-07-31 RX ORDER — FAMOTIDINE 10 MG/ML
20 INJECTION, SOLUTION INTRAVENOUS 2 TIMES DAILY
Status: DISCONTINUED | OUTPATIENT
Start: 2024-07-31 | End: 2024-07-31

## 2024-07-31 RX ORDER — PENICILLIN G POTASSIUM 5000000 [IU]/1
INJECTION, POWDER, FOR SOLUTION INTRAMUSCULAR; INTRAVENOUS
Status: DISCONTINUED
Start: 2024-07-31 | End: 2024-07-31

## 2024-07-31 RX ORDER — DEXTROSE MONOHYDRATE AND SODIUM CHLORIDE 5; .45 G/100ML; G/100ML
INJECTION, SOLUTION INTRAVENOUS CONTINUOUS
Status: DISCONTINUED | OUTPATIENT
Start: 2024-07-31 | End: 2024-07-31

## 2024-07-31 RX ORDER — MISOPROSTOL 100 UG/1
400 TABLET ORAL PRN
Status: DISCONTINUED | OUTPATIENT
Start: 2024-07-31 | End: 2024-07-31

## 2024-07-31 RX ORDER — NALBUPHINE HYDROCHLORIDE 10 MG/ML
10 INJECTION, SOLUTION INTRAMUSCULAR; INTRAVENOUS; SUBCUTANEOUS
Status: DISCONTINUED | OUTPATIENT
Start: 2024-07-31 | End: 2024-07-31

## 2024-07-31 RX ORDER — FERROUS SULFATE 325(65) MG
325 TABLET ORAL EVERY OTHER DAY
Status: DISCONTINUED | OUTPATIENT
Start: 2024-08-01 | End: 2024-08-02 | Stop reason: HOSPADM

## 2024-07-31 RX ORDER — ONDANSETRON 2 MG/ML
4 INJECTION INTRAMUSCULAR; INTRAVENOUS EVERY 6 HOURS PRN
Status: DISCONTINUED | OUTPATIENT
Start: 2024-07-31 | End: 2024-07-31

## 2024-07-31 RX ORDER — SODIUM CHLORIDE 9 MG/ML
INJECTION, SOLUTION INTRAVENOUS PRN
Status: DISCONTINUED | OUTPATIENT
Start: 2024-07-31 | End: 2024-08-02 | Stop reason: HOSPADM

## 2024-07-31 RX ORDER — M-VIT,TX,IRON,MINS/CALC/FOLIC 27MG-0.4MG
1 TABLET ORAL DAILY
COMMUNITY

## 2024-07-31 RX ORDER — TRANEXAMIC ACID 10 MG/ML
1000 INJECTION, SOLUTION INTRAVENOUS
Status: DISCONTINUED | OUTPATIENT
Start: 2024-07-31 | End: 2024-07-31

## 2024-07-31 RX ORDER — SODIUM CHLORIDE 9 MG/ML
25 INJECTION, SOLUTION INTRAVENOUS PRN
Status: DISCONTINUED | OUTPATIENT
Start: 2024-07-31 | End: 2024-07-31

## 2024-07-31 RX ORDER — FAMOTIDINE 10 MG/ML
20 INJECTION, SOLUTION INTRAVENOUS 2 TIMES DAILY
Status: DISCONTINUED | OUTPATIENT
Start: 2024-07-31 | End: 2024-08-02 | Stop reason: HOSPADM

## 2024-07-31 RX ORDER — METHYLERGONOVINE MALEATE 0.2 MG/ML
200 INJECTION INTRAVENOUS PRN
Status: DISCONTINUED | OUTPATIENT
Start: 2024-07-31 | End: 2024-07-31

## 2024-07-31 RX ORDER — BUPIVACAINE HYDROCHLORIDE 2.5 MG/ML
INJECTION, SOLUTION EPIDURAL; INFILTRATION; INTRACAUDAL PRN
Status: DISCONTINUED | OUTPATIENT
Start: 2024-07-31 | End: 2024-07-31 | Stop reason: SDUPTHER

## 2024-07-31 RX ORDER — INSULIN LISPRO 100 [IU]/ML
0-4 INJECTION, SOLUTION INTRAVENOUS; SUBCUTANEOUS
Status: DISCONTINUED | OUTPATIENT
Start: 2024-07-31 | End: 2024-08-02 | Stop reason: HOSPADM

## 2024-07-31 RX ORDER — DOCUSATE SODIUM 100 MG/1
100 CAPSULE, LIQUID FILLED ORAL 2 TIMES DAILY
Status: DISCONTINUED | OUTPATIENT
Start: 2024-07-31 | End: 2024-08-02 | Stop reason: HOSPADM

## 2024-07-31 RX ORDER — ACARBOSE 25 MG/1
25 TABLET ORAL
COMMUNITY

## 2024-07-31 RX ORDER — INSULIN LISPRO 100 [IU]/ML
0-4 INJECTION, SOLUTION INTRAVENOUS; SUBCUTANEOUS
Status: DISCONTINUED | OUTPATIENT
Start: 2024-08-01 | End: 2024-07-31

## 2024-07-31 RX ORDER — SODIUM CHLORIDE 0.9 % (FLUSH) 0.9 %
5-40 SYRINGE (ML) INJECTION PRN
Status: DISCONTINUED | OUTPATIENT
Start: 2024-07-31 | End: 2024-08-02 | Stop reason: HOSPADM

## 2024-07-31 RX ORDER — SODIUM CHLORIDE, SODIUM LACTATE, POTASSIUM CHLORIDE, CALCIUM CHLORIDE 600; 310; 30; 20 MG/100ML; MG/100ML; MG/100ML; MG/100ML
INJECTION, SOLUTION INTRAVENOUS CONTINUOUS
Status: DISCONTINUED | OUTPATIENT
Start: 2024-07-31 | End: 2024-07-31

## 2024-07-31 RX ADMIN — DEXTROSE AND SODIUM CHLORIDE: 5; 450 INJECTION, SOLUTION INTRAVENOUS at 08:04

## 2024-07-31 RX ADMIN — SODIUM CHLORIDE, POTASSIUM CHLORIDE, SODIUM LACTATE AND CALCIUM CHLORIDE 500 ML: 600; 310; 30; 20 INJECTION, SOLUTION INTRAVENOUS at 08:07

## 2024-07-31 RX ADMIN — SODIUM CHLORIDE, POTASSIUM CHLORIDE, SODIUM LACTATE AND CALCIUM CHLORIDE 1000 ML: 600; 310; 30; 20 INJECTION, SOLUTION INTRAVENOUS at 07:18

## 2024-07-31 RX ADMIN — Medication 166.7 ML: at 19:47

## 2024-07-31 RX ADMIN — Medication 1 MILLI-UNITS/MIN: at 11:25

## 2024-07-31 RX ADMIN — IBUPROFEN 800 MG: 800 TABLET, FILM COATED ORAL at 23:57

## 2024-07-31 RX ADMIN — DEXTROSE MONOHYDRATE 5 MILLION UNITS: 5 INJECTION INTRAVENOUS at 05:41

## 2024-07-31 RX ADMIN — Medication 2.5 MILLION UNITS: at 13:04

## 2024-07-31 RX ADMIN — Medication 15 ML/HR: at 07:55

## 2024-07-31 RX ADMIN — Medication 2.5 MILLION UNITS: at 09:46

## 2024-07-31 RX ADMIN — FAMOTIDINE 20 MG: 10 INJECTION, SOLUTION INTRAVENOUS at 18:30

## 2024-07-31 RX ADMIN — BUPIVACAINE HYDROCHLORIDE 1 ML: 2.5 INJECTION, SOLUTION EPIDURAL; INFILTRATION; INTRACAUDAL; PERINEURAL at 07:50

## 2024-07-31 RX ADMIN — Medication 2.5 MILLION UNITS: at 17:02

## 2024-07-31 RX ADMIN — DEXTROSE AND SODIUM CHLORIDE: 5; 450 INJECTION, SOLUTION INTRAVENOUS at 14:31

## 2024-07-31 ASSESSMENT — PAIN DESCRIPTION - LOCATION: LOCATION: ABDOMEN;BACK;PERINEUM

## 2024-07-31 ASSESSMENT — PAIN DESCRIPTION - DESCRIPTORS: DESCRIPTORS: CRAMPING

## 2024-07-31 NOTE — H&P
Obstetrics History and Physical    CC:   Chief Complaint   Patient presents with    Rupture of Membranes     24 0150       HPI: Vivian An is a 29 y.o.  at 39w2d who presents with complaints of leaking fluid.     Reports she woke up and went to the bathroom this morning around 0150.  States she felt a pop and then a gush of fluid and has continued leaking since that time.  Reports fluid is blood tinged and clear.  Reports contractions have increased in intensity since arrival in triage.  +FM.  Denies headache, vision changes, RUQ pain, increased LE edema.  Denies chest pain, shortness of breath, fever, chills, nausea, vomiting.      Pregnancy is complicated by GDMA2, fetal macrosomia, uterine size-dates discrepancy, GBS positive status and history of COVID-19.     Review of Systems: The following ROS was otherwise negative, except as noted in the HPI: constitutional, HEENT, respiratory, cardiovascular, gastrointestinal, genitourinary, skin, musculoskeletal, neurological, psych    OBGYN Provider : SANTANA Guillaume DO     Obstetrical History:  OB History    Para Term  AB Living   1 0 0 0 0 0   SAB IAB Ectopic Molar Multiple Live Births   0 0 0 0 0 0      # Outcome Date GA Lbr Wai/2nd Weight Sex Delivery Anes PTL Lv   1 Current              Past Medical History:   Past Medical History:   Diagnosis Date    Seasonal allergies      Medications:  Prior to Admission medications    Medication Sig Start Date End Date Taking? Authorizing Provider   Multiple Vitamins-Minerals (THERAPEUTIC MULTIVITAMIN-MINERALS) tablet Take 1 tablet by mouth daily   Yes Neris Swanson MD   acarbose (PRECOSE) 25 MG tablet Take 1 tablet by mouth 3 times daily (with meals)   Yes Neris Swanson MD   insulin NPH (HUMULIN N;NOVOLIN N) 100 UNIT/ML injection vial Inject 5 Units into the skin nightly   Yes Neris Swanson MD   Prenatal MV-Min-Fe Fum-FA-DHA (PRENATAL 1 PO) Take by mouth    Neris Swanson MD

## 2024-07-31 NOTE — PROGRESS NOTES
Brief Labor note:     29 y.o.  @ 39w2d, here after SROM in active labor.   Pt seen and examined.   Comfortable with epidural.  Doing well.  Denies HA / vision changes, RUQ pain or worsening edema at this time.     FHT:   135 bpm   mod variability  (-) accels, (-) decels   Contractions q 5-6 min     VE:    AC/100/0   Forebag noted -- ruptured with thick mec     IV Pitocin: 1mU     Plan:  Continue IV Pitocin per protocol   Reassuring fetal status   Continuous fetal monitoring   Notify peds of mec fluid / request delivery attendance     Please call with questions or concerns   Laverne Aguilar,

## 2024-07-31 NOTE — PROGRESS NOTES
Pt presents to triage stating she felt a lot of fetal movement this evening and then a \"pop\" followed by a gush of fluid at 0150. Fluid was very blood-tinged. Has noted an increase in contractions since this episode, and continues to leak a large amount of fluid. Placed on EFM at this time.

## 2024-07-31 NOTE — ANESTHESIA PROCEDURE NOTES
CSE Block    Patient location during procedure: OB  Start time: 7/31/2024 7:42 AM  End time: 7/31/2024 7:50 AM  Reason for block: labor epidural  Staffing  Performed: resident/CRNA   Anesthesiologist: Xavi Fong MD  Resident/CRNA: Ryan Vergara APRN - CRNA  Performed by: Ryan Vergara APRN - CRNA  Authorized by: Xavi Fong MD    CSE  Patient position: sitting  Prep: Betadine  Patient monitoring: continuous pulse ox and frequent blood pressure checks  Approach: midline  Provider prep: mask and sterile gloves  Spinal Needle  Needle type: pencil-tip   Needle gauge: 25 G  Needle length: 4.75 in  Epidural Needle  Injection technique: JERSEY saline  Needle type: Tuohy   Needle gauge: 17 G  Needle length: 3.5 in  Location: lumbar (1-5)  Catheter  Catheter type: side hole  Catheter size: 19 G  Test dose: negative (3 cc of 1.5 % xylocaine with Epi)  AssessmentT8  Hemodynamics: stable  Additional Notes  Pt. prepped and draped in sterile fashion. Skin wheal with 1% lidocaine. 17ga touhy needle to JERSEY. 25 ga. Spinal needle per touhy. CSF visualized in hub and 1 cc of 0.25 % Bupivacaine injected. Needle withdrawn and catheter threaded. Negative test dose. Catheter secured with sterile dressing.  Preanesthetic Checklist  Completed: patient identified, IV checked, risks and benefits discussed, equipment checked, pre-op evaluation, anesthesia consent given, oxygen available and monitors applied/VS acknowledged

## 2024-07-31 NOTE — ANESTHESIA PRE PROCEDURE
Department of Anesthesiology  Preprocedure Note       Name:  Vivian An   Age:  29 y.o.  :  1995                                          MRN:  [id number]         Date:  2024      Surgeon: * No surgeons listed *    Procedure: * No procedures listed *    Medications prior to admission:   Prior to Admission medications    Medication Sig Start Date End Date Taking? Authorizing Provider   Multiple Vitamins-Minerals (THERAPEUTIC MULTIVITAMIN-MINERALS) tablet Take 1 tablet by mouth daily   Yes ProviderNeris MD   acarbose (PRECOSE) 25 MG tablet Take 1 tablet by mouth 3 times daily (with meals)   Yes ProviderNeris MD   insulin NPH (HUMULIN N;NOVOLIN N) 100 UNIT/ML injection vial Inject 5 Units into the skin nightly   Yes ProviderNeris MD   Prenatal MV-Min-Fe Fum-FA-DHA (PRENATAL 1 PO) Take by mouth    ProviderNeris MD       Current medications:    Current Facility-Administered Medications   Medication Dose Route Frequency Provider Last Rate Last Admin    lactated ringers bolus 500 mL  500 mL IntraVENous PRN Gracy Sidhu DO        Or    lactated ringers bolus 500 mL  500 mL IntraVENous PRN Gracy Sidhu .7 mL/hr at 24 0807 500 mL at 24 0807    sodium chloride flush 0.9 % injection 5-40 mL  5-40 mL IntraVENous 2 times per day Gracy Sidhu DO        sodium chloride flush 0.9 % injection 5-40 mL  5-40 mL IntraVENous PRN Gracy Sidhu DO        0.9 % sodium chloride infusion  25 mL IntraVENous PRN Gracy Sidhu DO        terbutaline (BRETHINE) injection 0.25 mg  0.25 mg SubCUTAneous Once PRN Gracy Sidhu DO        nalbuphine (NUBAIN) injection 10 mg  10 mg IntraVENous Q2H PRN Gracy Sidhu DO        famotidine (PEPCID) injection 20 mg  20 mg IntraVENous BID rGacy Sidhu DO        penicillin G potassium 2.5 million units in 0.9% sodium chloride 100 mL IVPB  2.5 Million Units IntraVENous Q4H Gracy Sidhu  of COVID-19 Z86.16    Prenatal care in third trimester Z34.93    Insulin controlled gestational diabetes mellitus (GDM) in third trimester O24.414    Uterine size-date discrepancy in third trimester O26.843    Fetal macrosomia in pregnancy in third trimester O36.63X0    Positive GBS test B95.1    Normal labor O80, Z37.9       Past Medical History:        Diagnosis Date    Seasonal allergies        Past Surgical History:        Procedure Laterality Date    LABIAPLASTY      WISDOM TOOTH EXTRACTION         Social History:    Social History     Tobacco Use    Smoking status: Never    Smokeless tobacco: Never   Substance Use Topics    Alcohol use: Not Currently                                Counseling given: Not Answered      Vital Signs (Current):   Vitals:    07/31/24 0804 07/31/24 0807 07/31/24 0811 07/31/24 0813   BP: (!) 96/59 100/62 (!) 92/57 99/63   Pulse: (!) 107 74 82 72   Resp:       Temp:       TempSrc:       SpO2:       Weight:       Height:                                                  BP Readings from Last 3 Encounters:   07/31/24 99/63   07/29/24 120/74   07/26/24 128/64       NPO Status:                                                                                 BMI:   Wt Readings from Last 3 Encounters:   07/31/24 81.6 kg (180 lb)   07/29/24 81.8 kg (180 lb 6.4 oz)   07/26/24 82.5 kg (181 lb 12.8 oz)     Body mass index is 29.95 kg/m².    CBC:   Lab Results   Component Value Date/Time    WBC 8.9 07/31/2024 04:31 AM    RBC 4.43 07/31/2024 04:31 AM    HGB 14.3 07/31/2024 04:31 AM    HCT 42.2 07/31/2024 04:31 AM    MCV 95.3 07/31/2024 04:31 AM    RDW 14.6 07/31/2024 04:31 AM     07/31/2024 04:31 AM       CMP:   Lab Results   Component Value Date/Time     12/21/2023 08:27 PM    K 3.8 12/21/2023 08:27 PM     12/21/2023 08:27 PM    CO2 25 12/21/2023 08:27 PM    BUN 7 12/21/2023 08:27 PM    CREATININE <0.5 12/21/2023 08:27 PM    LABGLOM >60 12/21/2023 08:27 PM    GLUCOSE 98 12/21/2023

## 2024-08-01 LAB
GLUCOSE BLD-MCNC: 102 MG/DL (ref 70–99)
GLUCOSE BLD-MCNC: 96 MG/DL (ref 70–99)
GLUCOSE BLD-MCNC: 96 MG/DL (ref 70–99)
HCT VFR BLD AUTO: 38 % (ref 36–48)
HGB BLD-MCNC: 12.6 G/DL (ref 12–16)
PERFORMED ON: ABNORMAL
PERFORMED ON: NORMAL
PERFORMED ON: NORMAL

## 2024-08-01 PROCEDURE — 85014 HEMATOCRIT: CPT

## 2024-08-01 PROCEDURE — 1220000000 HC SEMI PRIVATE OB R&B

## 2024-08-01 PROCEDURE — 36415 COLL VENOUS BLD VENIPUNCTURE: CPT

## 2024-08-01 PROCEDURE — 99024 POSTOP FOLLOW-UP VISIT: CPT | Performed by: OBSTETRICS & GYNECOLOGY

## 2024-08-01 PROCEDURE — 85018 HEMOGLOBIN: CPT

## 2024-08-01 PROCEDURE — 6370000000 HC RX 637 (ALT 250 FOR IP): Performed by: OBSTETRICS & GYNECOLOGY

## 2024-08-01 RX ADMIN — DOCUSATE SODIUM 100 MG: 100 CAPSULE, LIQUID FILLED ORAL at 09:43

## 2024-08-01 RX ADMIN — ACETAMINOPHEN 1000 MG: 500 TABLET ORAL at 21:51

## 2024-08-01 RX ADMIN — IBUPROFEN 800 MG: 800 TABLET, FILM COATED ORAL at 17:56

## 2024-08-01 RX ADMIN — ACETAMINOPHEN 1000 MG: 500 TABLET ORAL at 13:53

## 2024-08-01 RX ADMIN — DOCUSATE SODIUM 100 MG: 100 CAPSULE, LIQUID FILLED ORAL at 21:51

## 2024-08-01 RX ADMIN — ACETAMINOPHEN 1000 MG: 500 TABLET ORAL at 06:22

## 2024-08-01 RX ADMIN — IBUPROFEN 800 MG: 800 TABLET, FILM COATED ORAL at 09:43

## 2024-08-01 ASSESSMENT — PAIN SCALES - GENERAL
PAINLEVEL_OUTOF10: 1
PAINLEVEL_OUTOF10: 2
PAINLEVEL_OUTOF10: 3
PAINLEVEL_OUTOF10: 0
PAINLEVEL_OUTOF10: 1

## 2024-08-01 ASSESSMENT — PAIN - FUNCTIONAL ASSESSMENT
PAIN_FUNCTIONAL_ASSESSMENT: ACTIVITIES ARE NOT PREVENTED

## 2024-08-01 ASSESSMENT — PAIN DESCRIPTION - DESCRIPTORS
DESCRIPTORS: SORE
DESCRIPTORS: BURNING

## 2024-08-01 ASSESSMENT — PAIN DESCRIPTION - LOCATION
LOCATION: ABDOMEN
LOCATION: PERINEUM
LOCATION: ABDOMEN;PERINEUM

## 2024-08-01 NOTE — LACTATION NOTE
This note was copied from a baby's chart.  LACTATION CONSULTATION      Follow-up Consult: Reason for Follow-up: assist with latching , assess needs , provide education, and RN request       Name: Girl Vivian An       MRN: [id number]               YOB: 2025   Time of Birth: 7:42 PM   Gestational age: Gestational Age: 39w2d   Birth Weight: Birth Weight: 3.565 kg (7 lb 13.8 oz) Most Recent Weight: Weight: 3.565 kg (7 lb 13.8 oz) (Filed from Delivery Summary)   Weight Change from Birth: 0%            Maternal Assessment:      Maternal Data:   Information for the patient's mother:  Vivian An [[id number]]   30 y.o.    /Para:   Information for the patient's mother:  Vivian An [[id number]]        Information for the patient's mother:  Vivian An [[id number]]   39w2d          Breast Assessment  Right Breast: Breasts not assessed this encounter     Left Breast: WDL  Left Nipple: Everts well , Small, and Short  Left Areola: Small   Left Nipple Comfort: comfortable   Left Nipple Integrity: Intact     Infant Assessment:      DOL:4 hours       Feeding: Breastfeeding         Nipple Shield in Use: No     I&O adequacy:  Urine output: is not established  Stool output: is established  Percent weight change from birthweight: 0%     Oral Assessment:   Oral assessment not completed at this time.      Birth Factors/Diagnosis that could create risk for breastfeeding:   Infant of a diabetic mother     Glucose: Yes   Glucoses: 86,71     Intervention during consultation:     Interventions Performed:   Assisted with breastfeeding , Hand expression, and Education     Latch & Positioning: Assisted MOB to position infant to the left breast in a football hold. Reviewed position and hold. Reviewed hand expression prior to feeds, nipple to nose, compression on the breast and bringing infant up and onto the breast for latch. Infant able to latch after a couple of attempts and maintain short to moderate coordinated

## 2024-08-01 NOTE — PROGRESS NOTES
First time get up to BR w/ assist x 2 RN's. Pt voided 300ml  urine without difficulty. Pt performed becka care per self after instruction. New ice pack, peripad, and underwear provided. Pt back to bed w/ out incident, gait steady. Pt tolerated well.

## 2024-08-01 NOTE — ANESTHESIA POSTPROCEDURE EVALUATION
Department of Anesthesiology  Postprocedure Note    Patient: Vivian An  MRN: [id number]  YOB: 1995  Date of evaluation: 8/1/2024    Procedure Summary       Date: 07/31/24 Room / Location:     Anesthesia Start: 0733 Anesthesia Stop: 1942    Procedure: Labor Analgesia Diagnosis:     Scheduled Providers:  Responsible Provider: Xavi Fong MD    Anesthesia Type: CSE ASA Status: 2 - Emergent            Anesthesia Type: No value filed.    Adolfo Phase I:      Adolfo Phase II:      Anesthesia Post Evaluation    Level of consciousness: awake  Cardiovascular status: hemodynamically stable  Respiratory status: acceptable  Comments: No apparent complications from neuraxial anesthesia.  Pain management: adequate        No notable events documented.

## 2024-08-01 NOTE — L&D DELIVERY SUMMARY NOTE
[facility] Obstetrics and Gynecology  Spontaneous Vaginal Delivery Note        Pre-operative Diagnosis:    29 y.o.  39w2d EGA  Here s/p SROM in active labor   GDMA2   Suspected fetal macrosomia   GBS (+)       Post-operative Diagnosis:   Same   Terminal right hand              Anesthesia:  Epidural       Admission and Delivery:       Patient presented to [facility] Labor and Delivery for above.  She received IV Pitocin, rupture of forebag and Epidural for comfort.      Patient progressed spontaneously to complete cervical dilation.  Began pushing and with good maternal effort.  The infant was delivered in the DENISE position over intact perineum with a terminal right hand that required reduction. Nuchal cord absent. The shoulders and body were quickly to follow with maternal efforts. Infant was delivered with good tone and spontaneous cry without complication. Mouth and nose were bulb suctioned at maternal abdomen. Delayed cord clamping, partner cut cord after 1 min as cord no longer pulsating. Cord segment and cord blood were obtained.  Cord gasses were collected, however due to fetal wellbeing were discarded.  APGARS were noted to be 8 and 9. Delivery Date and Time: 2024 .      The placenta was delivered spontaneously with manual massage of the uterus and was noted to be intact with a 3 vessel cord. Pitocin was started immediately after placenta delivery. Bleeding noted to be appropriate.     1st degree laceration was noted and repaired with 2-0 Vicryl.      The patient tolerated well and is in stable condition following delivery.      EBL: 200 ml     Infant Wt: Pending     APGARS: 8, 9      Specimen:  Placenta / Cord segment      Condition:  infant stable and mother stable      Attending Attestation: I performed the procedure.      Laverne Aguilar DO

## 2024-08-01 NOTE — LACTATION NOTE
This note was copied from a baby's chart.  LACTATION CONSULTATION      Follow-up Consult: Reason for Follow-up: assist with latching  and Maternal request  MOB reports infant has been latching well overnight. More fussy this morning, and requesting breastfeeding assistance. Nipples are sore bilaterally.        Name: Girl Vivian An       MRN: [id number]               YOB: 2025   Time of Birth: 7:42 PM   Gestational age: Gestational Age: 39w2d   Birth Weight: Birth Weight: 3.565 kg (7 lb 13.8 oz) Most Recent Weight: Weight: 3.565 kg (7 lb 13.8 oz) (Filed from Delivery Summary)   Weight Change from Birth: 0%            Maternal Assessment:      Maternal Data:   Information for the patient's mother:  Vivian An [[id number]]   30 y.o.    /Para:   Information for the patient's mother:  Vivian An [[id number]]        Information for the patient's mother:  Vivian An [[id number]]   39w2d          Breast Assessment  Right Breast: WDL  Right Nipple: Everts well , Small, and Short  Right Areola: WDL   Right Nipple Comfort: Pain improved with position and latch assistance and sore  Right Nipple Integrity: Intact    Left Breast: WDL  Left Nipple: Everts well , Small, and Short  Left Areola: WDL   Left Nipple Comfort: Pain improved with position and latch assistance  Left Nipple Integrity: Intact and Sore     Infant Assessment:      DOL:Infant first DOL      Feeding: Breastfeeding      Nipple Shield in Use: No  Nipple Shield Size:      I&O adequacy:  Urine output: is established  Stool output: is established  Percent weight change from birthweight: 0%     Oral Assessment:   Palate:intact   Frenulum:appears to have appropriate mobility   Frenotomy Performed: No         TABBY SCORE: 8    Scoring of TABBY tool  A score of:   8 indicates normal tongue function;   6 or 7 are considered as borderline: suggest a                              'wait and see' approach with support for  breastfeeding

## 2024-08-01 NOTE — PLAN OF CARE
Problem: Pain  Goal: Verbalizes/displays adequate comfort level or baseline comfort level  2024 by Sintia Sullivan RN  Outcome: Progressing  2024 by Colleen Castillo RN  Outcome: Progressing     Problem: Postpartum  Goal: Experiences normal postpartum course  Description:  Postpartum OB-Pregnancy care plan goal which identifies if the mother is experiencing a normal postpartum course  2024 by Sintia Sullivan RN  Outcome: Progressing  2024 by Colleen Castillo RN  Outcome: Progressing  Goal: Appropriate maternal -  bonding  Description:  Postpartum OB-Pregnancy care plan goal which identifies if the mother and  are bonding appropriately  2024 by Sintia Sullivan RN  Outcome: Progressing  2024 by Colelen Castillo RN  Outcome: Progressing  Goal: Establishment of infant feeding pattern  Description:  Postpartum OB-Pregnancy care plan goal which identifies if the mother is establishing a feeding pattern with their   2024 by Sintia Sullivan RN  Outcome: Progressing  2024 by Colleen Castillo RN  Outcome: Progressing  Goal: Incisions, wounds, or drain sites healing without S/S of infection  2024 by Sintia Sullivan RN  Outcome: Progressing  2024 by Colleen Castillo RN  Outcome: Progressing     Problem: Infection - Adult  Goal: Absence of infection at discharge  2024 by Sintia Sullivan RN  Outcome: Progressing  2024 by Colleen Castillo RN  Outcome: Progressing  Goal: Absence of infection during hospitalization  2024 by Sintia Sullivan RN  Outcome: Progressing  2024 by Colleen Castillo RN  Outcome: Progressing  Goal: Absence of fever/infection during anticipated neutropenic period  2024 by Sintia Sullivan RN  Outcome: Progressing  2024 by Colleen Castillo RN  Outcome: Progressing     Problem: Safety - Adult  Goal: Free from fall injury  2024 by Nate

## 2024-08-01 NOTE — PLAN OF CARE
Problem: Pain  Goal: Verbalizes/displays adequate comfort level or baseline comfort level  2024 by Colleen Castillo RN  Outcome: Progressing  2024 by Gwen Barrera RN  Outcome: Progressing     Problem: Postpartum  Goal: Experiences normal postpartum course  Description:  Postpartum OB-Pregnancy care plan goal which identifies if the mother is experiencing a normal postpartum course  2024 by Colleen Castillo RN  Outcome: Progressing  2024 by Gwen Barrera RN  Outcome: Progressing  Goal: Appropriate maternal -  bonding  Description:  Postpartum OB-Pregnancy care plan goal which identifies if the mother and  are bonding appropriately  2024 by Colleen Castillo RN  Outcome: Progressing  2024 by Gwen Barrera RN  Outcome: Progressing  Goal: Establishment of infant feeding pattern  Description:  Postpartum OB-Pregnancy care plan goal which identifies if the mother is establishing a feeding pattern with their   2024 by Colleen Castillo RN  Outcome: Progressing  2024 by Gwen Barrera RN  Outcome: Progressing  Goal: Incisions, wounds, or drain sites healing without S/S of infection  2024 by Colleen Castillo RN  Outcome: Progressing  2024 by Gwen Barrera RN  Outcome: Progressing     Problem: Infection - Adult  Goal: Absence of infection at discharge  2024 by Colleen Castillo RN  Outcome: Progressing  2024 by Gwen Barrera RN  Outcome: Progressing  Goal: Absence of infection during hospitalization  2024 by Colleen Castillo RN  Outcome: Progressing  2024 by Gwen Barrera RN  Outcome: Progressing  Goal: Absence of fever/infection during anticipated neutropenic period  2024 by Colleen Castillo RN  Outcome: Progressing  2024 by Gwen Barrera RN  Outcome: Progressing     Problem: Safety - Adult  Goal: Free from fall injury  2024 by Jonathan

## 2024-08-01 NOTE — LACTATION NOTE
This note was copied from a baby's chart.  LACTATION CONSULTATION  Initial Lactation Consult:   Referred by: RN request and Census- feeding preference     Name: Wanda An       MRN: [id number]         YOB: 2025   Time of Birth: 7:42 PM   Gestational age: Gestational Age: 39w2d   Birth Weight: Birth Weight: 3.565 kg (7 lb 13.8 oz) Most Recent Weight: Weight: 3.565 kg (7 lb 13.8 oz) (Filed from Delivery Summary)   Weight Change from Birth: 0%           Maternal Assessment:  Maternal Data:  Information for the patient's mother:  Vivian An [[id number]]   30 y.o.   /Para:   Information for the patient's mother:  Vivian An [[id number]]      Information for the patient's mother:  Vivian An [[id number]]   39w2d     Prenatal Breastfeeding Education: None    Prior Breastfeeding Experience: 1st time breastfeeding with this baby     Breastfeeding Goal: Exclusively Breastfeed     Breast Assessment  Right Breast: WDL  Right Nipple: Everts well  and Small  Right Areola: Small   Right Nipple Comfort: comfortable   Right Nipple Integrity: Intact    Left Breast: Breasts not assessed this encounter     Medications of Concern:None    Maternal Toxicology:   Information for the patient's mother:  Vivian An [[id number]]     Barbiturate Screen, Ur   Date Value Ref Range Status   2024 Neg Negative <200 ng/mL Final     Benzodiazepine Screen, Urine   Date Value Ref Range Status   2024 Neg Negative <200 ng/mL Final     Cannabinoid Scrn, Ur   Date Value Ref Range Status   2024 Neg Negative <50 ng/mL Final     Cocaine Metabolite Screen, Urine   Date Value Ref Range Status   2024 Neg Negative <300 ng/mL Final     Methadone Screen, Urine   Date Value Ref Range Status   2024 Neg Negative <300 ng/mL Final     pH, Urine   Date Value Ref Range Status   2024 7.0  Final     Comment:     Urine pH less than 5.0 or greater than 8.0 may indicate sample adulteration.  Another

## 2024-08-02 VITALS
WEIGHT: 180 LBS | OXYGEN SATURATION: 97 % | SYSTOLIC BLOOD PRESSURE: 111 MMHG | RESPIRATION RATE: 16 BRPM | TEMPERATURE: 97.9 F | BODY MASS INDEX: 29.99 KG/M2 | HEIGHT: 65 IN | DIASTOLIC BLOOD PRESSURE: 76 MMHG | HEART RATE: 75 BPM

## 2024-08-02 LAB
GLUCOSE BLD-MCNC: 102 MG/DL (ref 70–99)
GLUCOSE BLD-MCNC: 69 MG/DL (ref 70–99)
GLUCOSE BLD-MCNC: 77 MG/DL (ref 70–99)
PERFORMED ON: ABNORMAL
PERFORMED ON: ABNORMAL
PERFORMED ON: NORMAL

## 2024-08-02 PROCEDURE — 6370000000 HC RX 637 (ALT 250 FOR IP): Performed by: OBSTETRICS & GYNECOLOGY

## 2024-08-02 PROCEDURE — 0503F POSTPARTUM CARE VISIT: CPT | Performed by: OBSTETRICS & GYNECOLOGY

## 2024-08-02 RX ORDER — IBUPROFEN 800 MG/1
800 TABLET ORAL EVERY 8 HOURS SCHEDULED
Qty: 120 TABLET | Refills: 3 | Status: SHIPPED | OUTPATIENT
Start: 2024-08-02

## 2024-08-02 RX ORDER — PSEUDOEPHEDRINE HCL 30 MG
100 TABLET ORAL 2 TIMES DAILY
Qty: 120 CAPSULE | Refills: 0 | Status: SHIPPED | OUTPATIENT
Start: 2024-08-02

## 2024-08-02 RX ADMIN — IBUPROFEN 800 MG: 800 TABLET, FILM COATED ORAL at 08:54

## 2024-08-02 RX ADMIN — DOCUSATE SODIUM 100 MG: 100 CAPSULE, LIQUID FILLED ORAL at 08:54

## 2024-08-02 RX ADMIN — ACETAMINOPHEN 1000 MG: 500 TABLET ORAL at 05:58

## 2024-08-02 RX ADMIN — ACETAMINOPHEN 1000 MG: 500 TABLET ORAL at 14:20

## 2024-08-02 RX ADMIN — IBUPROFEN 800 MG: 800 TABLET, FILM COATED ORAL at 00:53

## 2024-08-02 ASSESSMENT — PAIN DESCRIPTION - LOCATION
LOCATION: PERINEUM
LOCATION: PERINEUM
LOCATION: ABDOMEN

## 2024-08-02 ASSESSMENT — PAIN SCALES - GENERAL
PAINLEVEL_OUTOF10: 2
PAINLEVEL_OUTOF10: 5
PAINLEVEL_OUTOF10: 1

## 2024-08-02 ASSESSMENT — PAIN DESCRIPTION - DESCRIPTORS
DESCRIPTORS: DISCOMFORT
DESCRIPTORS: SORE
DESCRIPTORS: PRESSURE

## 2024-08-02 ASSESSMENT — PAIN - FUNCTIONAL ASSESSMENT
PAIN_FUNCTIONAL_ASSESSMENT: ACTIVITIES ARE NOT PREVENTED

## 2024-08-02 ASSESSMENT — PAIN DESCRIPTION - ORIENTATION
ORIENTATION: LOWER
ORIENTATION: LOWER

## 2024-08-02 NOTE — PROGRESS NOTES
Department of Obstetrics and Gynecology  Labor and Delivery  Attending Post Partum Progress Note      SUBJECTIVE:  30 y/o  female S/P uncomplicated Vaginal Delivery.     The pregnancy was complicated by GDMA2, fetal macrosomia, uterine size-date discrepancy, GBS positive and history of COVID-19.      No current complaints are noted including headache, change in vision, fever, chills, chest pain, shortness of breath, nausea, vomiting, diarrhea or constipation. The patient denies any urinary complaints or calf tenderness.  Lochia is described as mild. The patient plans to breastfeed.      OBJECTIVE:      Vitals:  /76   Pulse 75   Temp 97.9 °F (36.6 °C) (Oral)   Resp 16   Ht 1.651 m (5' 5\")   Wt 81.6 kg (180 lb)   LMP 10/30/2023 (Exact Date)   SpO2 97%   Breastfeeding Unknown   BMI 29.95 kg/m²     CONSTITUTIONAL:  awake, alert, cooperative, no apparent distress, and appears stated age  LUNGS:  No increased work of breathing, good air exchange, clear to auscultation bilaterally, no crackles or wheezing  CARDIOVASCULAR:  normal S1 and S2  ABDOMEN:  soft, non-distended, and non-tender  MUSCULOSKELETAL:  full range of motion noted  NEUROLOGIC:  Mental Status Exam:  Level of Alertness:   awake  Orientation:   person, place, time  Memory:   normal  Fund of Knowledge:  normal  Attention/Concentration:  normal  Language:  normal  SKIN:  normal skin color, texture, turgor    DATA:    Hemoglobin and Hematocrit  Order: [id number]  Status: Final result       Visible to patient: Yes (not seen)       Next appt: None    0 Result Notes          Component  Ref Range & Units 24 0531 24 0431 24 0932 24 1114 23   Hemoglobin  12.0 - 16.0 g/dL 12.6 14.3 11.9 Low  14.6 14.8   Hematocrit  36.0 - 48.0 % 38.0 42.2 35.3 Low  42.4 44.9   WBC  8.9 R 10.5 R 9.8 R 11.7 High  R   Lymphocytes %  16.8 R 10.0 R 16.7 R 22.9 R   Monocytes %  7.0 R 5.0 R 5.4 R 6.6 R   Eosinophils %  1.1 R 1.0 R 1.5 R 1.9 R    Basophils %  0.2 R 0.0 R 0.5 R 0.4 R   Neutrophils Absolute  6.6 R 8.8 High  R 7.5 R 8.0 High  R   Lymphocytes Absolute  1.5 R 1.1 R 1.6 R 2.7 R   Monocytes Absolute  0.6 R 0.5 R 0.5 R 0.8 R   Eosinophils Absolute  0.1 R 0.1 R 0.1 R 0.2 R   Basophils Absolute  0.0 R 0.0 R 0.1 R 0.0 R   PLATELET SLIDE REVIEW   Adequate     RBC  4.43 R 3.66 Low  R 4.59 R 4.77 R   SLIDE REVIEW   see below CM     Bands Relative   9 High  R     Metamyelocytes Relative   1 Abnormal  R     MCV  95.3 R 96.5 R 92.4 R 94.2 R   MCH  32.4 R 32.4 R 31.8 R 31.2 R   MCHC  34.0 R 33.5 R 34.5 R 33.1 R   RDW  14.6 R 14.2 R 13.2 R 12.9 R   Platelets  174 R 227 R 191 R 218 R   MPV  10.1 R 9.3 R 9.9 R 8.9 R   Neutrophils %  74.9 R 74.0 R 75.9 R 68.2 R   Resulting Agency [facility]              Specimen Collected: 08/01/24 05:31 EDT Last Resulted: 08/01/24 05:58 EDT             ASSESSMENT & PLAN:      Impression:  S/P Vaginal Delivery, PPD #2--meeting postpartum milestones  GDMA2, fetal macrosomia, uterine size-date discrepancy  GBS positive  history of COVID-19.      Plan:  See orders and Patient Instructions  DC to home today     Laverne L Jeff, DO   [facility]

## 2024-08-02 NOTE — DISCHARGE SUMMARY
Department of Obstetrics and Gynecology  Postpartum Discharge Summary      Admit Date: 2024    Admit Diagnosis: Normal labor [O80, Z37.9]    Discharge Date: 24    Condition at Discharge: good    Discharge Diagnoses: spontaneous vaginal delivery    Discharge Disposition:  Home    Service: Obstetrics    Postpartum complications: none     Hospital Course: uncomplicated    [place] Data:  Information for the patient's :  Wanda An [[id number]]      Weight   Information for the patient's :  Wanda nA [[id number]]      Apgars   Information for the patient's :  Wanda An [[id number]]       Disposition of Baby:  Home with mother    FU in 2 weeks in office    Current Discharge Medication List        START taking these medications    Details   docusate sodium (COLACE, DULCOLAX) 100 MG CAPS Take 100 mg by mouth 2 times daily  Qty: 120 capsule, Refills: 0      ibuprofen (ADVIL;MOTRIN) 800 MG tablet Take 1 tablet by mouth every 8 hours  Qty: 120 tablet, Refills: 3           CONTINUE these medications which have NOT CHANGED    Details   Multiple Vitamins-Minerals (THERAPEUTIC MULTIVITAMIN-MINERALS) tablet Take 1 tablet by mouth daily      acarbose (PRECOSE) 25 MG tablet Take 1 tablet by mouth 3 times daily (with meals)      insulin NPH (HUMULIN N;NOVOLIN N) 100 UNIT/ML injection vial Inject 5 Units into the skin nightly      Prenatal MV-Min-Fe Fum-FA-DHA (PRENATAL 1 PO) Take by mouth             Electronically signed by Laverne Aguilar DO on 2024 at 12:44 PM

## 2024-08-02 NOTE — DISCHARGE INSTR - ACTIVITY
Call for increased pain, significant vaginal bleeding (soaking through 2 pads in < 1hr) or temperature greater than 101 degrees F.    Nothing in vagina for 6 weeks (pelvic rest), including intercourse, tampons, toys, fingers.   Advance activity as tolerated but limit lifting <10 lbs or weight of baby in carrier for first 2 weeks.   Continue PNV.   Take PRN medications as prescribed.   FU in office in 2 weeks.     Normal Diet     Please call with questions or concerns.   Laverne Aguilar, DO

## 2024-08-02 NOTE — LACTATION NOTE
This note was copied from a baby's chart.  LACTATION CONSULTATION      Follow-up Consult: Reason for Follow-up: assess needs , provide education, and Maternal request       Name: Girl Vivian An       MRN: [id number]               YOB: 2025   Time of Birth: 7:42 PM   Gestational age: Gestational Age: 39w2d   Birth Weight: Birth Weight: 3.565 kg (7 lb 13.8 oz) Most Recent Weight: Weight: 3.565 kg (7 lb 13.8 oz) (Filed from Delivery Summary)   Weight Change from Birth: 0%            Maternal Assessment:      Maternal Data:   Information for the patient's mother:  Vivian An [[id number]]   30 y.o.    /Para:   Information for the patient's mother:  Vivian An [[id number]]        Information for the patient's mother:  Vivian An [[id number]]   39w2d          Breast Assessment  Right Breast: WDL  Right Nipple: Everts well  and Small  Right Areola: Small   Right Nipple Comfort: comfortable   Right Nipple Integrity: Intact    Left Breast: Breasts not assessed this encounter      Infant Assessment:      DOL:1      Feeding: Breastfeeding      Nipple Shield in Use: No     I&O adequacy:  Urine output: is established  Stool output: is established  Percent weight change from birthweight: 0%     Oral Assessment:   Oral assessment not completed at this time.     Birth Factors/Diagnosis that could create risk for breastfeeding:   Infant of a diabetic mother     Glucose: Yes   Glucoses: 86,71,77     Intervention during consultation:     Interventions Performed:   Education     Latch & Positioning: MOB with infant positioned to the right breast in a cradle hold upon LC entry. MOB reports infant latching well, some discomfort. Infant released breast soon after LC entry and appeared satisfied at this time. MOB with questions about infant being sleepy for a period of about 5 hours earlier. Reviewed with MOB infant sleepiness does happen as infant is getting closer to 24 hours and then really wakes up and  wants to clusterfeed. MOB reports she had attempted feeding and infant would do short bursts. Encouraged to call for continued assist. Name and number on white board.     Education:  Latch & positioning , Feeding frequency & feeding cues , and reviewed infant sleepiness and clusterfeeding expectations.           Action/Plan:  Breastfeed on cue and at least 8 times/24 hours, unlimited timing. May not nurse this often in the first 24 hours. Wake baby and offer breastfeeding if it has been 3 hours since the beginning of last feeding. Place infant skin to skin if infant will not breastfeed at 3 hours.   Hand express prior to latch to armin nipple and entice infant to the breast.   It is important to use gentle stimulation during feeding to promote active eating. Offer both breasts at every feeding. Burp infant in between sides. Alternate which breast is used first.   Offer STS often while awake. Mother holding infant skin to skin between feedings will promote milk supply and allow infant to rest more deeply.   Maintain a feeding log until infant is gaining weight and seen by primary care physician.   Request breastfeeding assistance from LC or RN as needed.     Feeding Plan reviewed with: Mom    Response:   Verbalized understanding of education and instruction

## 2024-08-02 NOTE — DISCHARGE INSTRUCTIONS
Thank you for the opportunity to care for you and your family.    We hope that you are happy with the care we provided during your stay in the [facility]. We want to ensure that you have the help you need when you leave the hospital. If there is anything we can assist you with, please let us know.    Breastfeeding mothers may contact our lactation specialists with any problems or questions.  [facility] lactation services phone number is [phone].  Leave a message and your call will be returned.    Please refer to the information provided in the postpartum care booklet.  The following are warning signs to remember.    Call 911 if you have:    Chest pain or pressure  Shortness of breath, even at rest  Thoughts of harming yourself or others  Seizures    Call your healthcare provider if you have:    Temperature of 100.4 degrees or higher  Stitches that are not healing        -- Swelling, bleeding, drainage, foul odor, redness or warmth in/around your           stitches, staples, or incision (scar)        -- Bad smelling blood or discharge from the vagina  Vaginal bleeding that has increased         -- Soaking through one pad in an hour        -- You are passing clots larger than the size of a lemon  Red, warm tender area(s) in your breast or calf  Headache that does not get better, even after taking medicine; or headache with vision changes    Remember to notify all healthcare providers from your date of delivery to up to one year after giving birth!    CARING FOR YOURSELF        DIET/ACTIVITY    Eat a well balanced diet focusing on foods high in fiber and protein.  Drink plenty of fluids, especially water.  To avoid constipation you may take a mild stool softener as recommended by your doctor or midwife.  Gradually increase your activity. Resume an exercise regime only after being advised by your doctor or midwife.  When sitting or lying down, keep your legs elevated to reduce swelling.  Avoid

## 2024-08-02 NOTE — PROGRESS NOTES
Ongoing SW/CM Assessment/Plan of Care Note     Patient agreeable to a TV.        Report received from Carey ZARAGOZA. Bedside report given. Introduced myself to pt as her RN for the day. I put my name and phone number on the white board and showed pt how to use her room phone to get a hold of me. Pt was given her plan of care for the day. Call light within reach. Bed in lowest position and wheels are locked. Pt verbalized understanding and denies any further needs at this time.Continue to monitor.

## 2024-08-02 NOTE — PROGRESS NOTES
Discharge Phone Call    Patient Name: Vivian An     OB Care Provider: Gracy Sidhu DO Discharge Date: 2024    Disposition of baby:    Phone Number: [phone] (home)     Attempts to Contact:  Date:    Caller  Date:    Caller  Date:    Caller    Information for the patient's :  Nataliya, Wanda Park [[id number]]   Delivery Method: Vaginal, Spontaneous     1.  Now that you are at home is your pain being well controlled?   Y/N   If no, instruct to call       provider.      2. Are you breastfeeding?    Y/N    Do you need any extra support from our lactation staff?      Y/N    If yes, provide number for lactation.  3. Have you made or already had your first appointment with the baby's doctor? Y/N   If no, do      you know when to schedule it?  Y/N    4. Have you scheduled your follow-up appointment?  Y/N  If no, do you know when to schedule       it?    Y/N   If no, they can find it on printed discharge instructions.  5. Did staff discuss safe sleep during your stay? Y/N   6. Did we explain things in a way you could understand?  Y/N  7. Were we respectful of your preferences for labor and birth and include you in the plan of       care?  Y/N  If no, please explain _______________________________________________  8. Is there anyone in particular you would like to mention who provided care for you? _______      _________________________________________________________________________     9. Were you given a Post-Birth Warning Signs handout?  Y/N  Do you have it somewhere      easily accessible?  Y/N  If no, please send them a copy and ask them to put it somewhere      easily found.  10. Have you been crying excessively, having anger or mood swings that feel out of control, or       feel like you can't cope with caring for yourself or baby? Y/N   If yes, they may be showing       signs of postpartum depression and should call provider. There is also a        depression test on page C5 in their

## 2024-08-02 NOTE — PROGRESS NOTES
Department of Obstetrics and Gynecology  Labor and Delivery  Attending Post Partum Progress Note      SUBJECTIVE:  28 y/o  female S/P uncomplicated Vaginal Delivery. The pregnancy was complicated by GDMA2, fetal macrosomia, uterine size-date discrepancy, GBS positive and history of COVID-19.    No current complaints are noted including headache, change in vision, fever, chills, chest pain, shortness of breath, nausea, vomiting, diarrhea or constipation. The patient denies any urinary complaints or calf tenderness.  Lochia is described as mild. The patient plans to breastfeed.      OBJECTIVE:      Vitals:  /66   Pulse 67   Temp 97.8 °F (36.6 °C) (Oral)   Resp 16   Ht 1.651 m (5' 5\")   Wt 81.6 kg (180 lb)   LMP 10/30/2023 (Exact Date)   SpO2 98%   Breastfeeding Unknown   BMI 29.95 kg/m²     CONSTITUTIONAL:  awake, alert, cooperative, no apparent distress, and appears stated age  LUNGS:  No increased work of breathing, good air exchange, clear to auscultation bilaterally, no crackles or wheezing  CARDIOVASCULAR:  normal S1 and S2  ABDOMEN:  soft, non-distended, and non-tender  MUSCULOSKELETAL:  full range of motion noted  NEUROLOGIC:  Mental Status Exam:  Level of Alertness:   awake  Orientation:   person, place, time  Memory:   normal  Fund of Knowledge:  normal  Attention/Concentration:  normal  Language:  normal  SKIN:  normal skin color, texture, turgor    DATA:    Hemoglobin and Hematocrit  Order: [id number]  Status: Final result       Visible to patient: Yes (not seen)       Next appt: None    0 Result Notes          Component  Ref Range & Units 24 0531 24 0431 24 0932 24 1114 23   Hemoglobin  12.0 - 16.0 g/dL 12.6 14.3 11.9 Low  14.6 14.8   Hematocrit  36.0 - 48.0 % 38.0 42.2 35.3 Low  42.4 44.9   WBC  8.9 R 10.5 R 9.8 R 11.7 High  R   Lymphocytes %  16.8 R 10.0 R 16.7 R 22.9 R   Monocytes %  7.0 R 5.0 R 5.4 R 6.6 R   Eosinophils %  1.1 R 1.0 R 1.5 R 1.9 R

## 2024-08-02 NOTE — LACTATION NOTE
This note was copied from a baby's chart.  LACTATION CONSULTATION      Follow-up Consult: Reason for Follow-up: assess needs  and discharge education       ID#:       Name: Girl Vivian An       MRN: [id number]               YOB: 2025   Time of Birth: 7:42 PM   Gestational age: Gestational Age: 39w2d   Birth Weight: Birth Weight: 3.565 kg (7 lb 13.8 oz) Most Recent Weight: Weight: 3.39 kg (7 lb 7.6 oz)   Weight Change from Birth: -5%            Maternal Assessment:      Maternal Data:   Information for the patient's mother:  Vivian An [[id number]]   30 y.o.    /Para:   Information for the patient's mother:  Vivian An [[id number]]        Information for the patient's mother:  Vivian An [[id number]]   39w2d          Breast Assessment  Right Breast: Breasts not assessed this encounter   Left Breast: Breasts not assessed this encounter      Infant Assessment:      DOL:41 hours of life      Feeding: Breastfeeding      Nipple Shield in Use: No     I&O adequacy:  Urine output: is established  Stool output: is established  Percent weight change from birthweight: -5%     Birth Factors/Diagnosis that could create risk for breastfeeding:   Infant of a diabetic mother     Glucose: Yes   Glucoses: 86, 71, 77, 68     Intervention during consultation:     Interventions Performed:   Education     Latch & Positioning: Reviewed importance of DANGELO, gave tips to achieve, and educated on how a good latch should look and feel. Educated on how to break the suction of the latch as needed if shallow or causing discomfort. Encouraged to call for IBCLC to assess latch with next feeding and as needed during hospital stay.     Pump Arrangements:  Owns breast pump she received via insurance [employer], and [employer] wear[employer].    Pump Distributed: No     Education:  Latch & positioning , Feeding frequency & feeding cues , Exclusive breastfeeding , Discharge breastfeeding education , Breastfeeding Booklet

## 2024-08-02 NOTE — PLAN OF CARE
Problem: Pain  Goal: Verbalizes/displays adequate comfort level or baseline comfort level  2024 by Tom Koenig RN  Outcome: Progressing  2024 by Johana Bansal RN  Outcome: Progressing     Problem: Postpartum  Goal: Experiences normal postpartum course  Description:  Postpartum OB-Pregnancy care plan goal which identifies if the mother is experiencing a normal postpartum course  2024 by Tom Koenig RN  Outcome: Progressing  2024 by Johana Bansal RN  Outcome: Progressing  Goal: Appropriate maternal -  bonding  Description:  Postpartum OB-Pregnancy care plan goal which identifies if the mother and  are bonding appropriately  2024 by Tom Koenig RN  Outcome: Progressing  2024 by Johana Bansal RN  Outcome: Progressing  Goal: Establishment of infant feeding pattern  Description:  Postpartum OB-Pregnancy care plan goal which identifies if the mother is establishing a feeding pattern with their   2024 by Tom Koenig RN  Outcome: Progressing  2024 by Johana Bansal RN  Outcome: Progressing  Goal: Incisions, wounds, or drain sites healing without S/S of infection  2024 by Tom Koenig RN  Outcome: Progressing  2024 by Johana Bansal RN  Outcome: Progressing     Problem: Infection - Adult  Goal: Absence of infection at discharge  2024 by Tom Koenig RN  Outcome: Progressing  2024 by Johana Bansal RN  Outcome: Progressing  Goal: Absence of infection during hospitalization  2024 by Tom Koenig RN  Outcome: Progressing  2024 by Johana Bansal RN  Outcome: Progressing  Goal: Absence of fever/infection during anticipated neutropenic period  2024 by Tom Koenig RN  Outcome: Progressing  2024 by Johana Bansal RN  Outcome: Progressing     Problem: Safety - Adult  Goal: Free from fall

## 2024-08-05 ENCOUNTER — TELEPHONE (OUTPATIENT)
Dept: PRIMARY CARE CLINIC | Age: 29
End: 2024-08-05

## 2024-08-05 NOTE — TELEPHONE ENCOUNTER
Care Transitions Initial Follow Up Call    Outreach made within 2 business days of discharge: Yes    Patient: Vivian An Patient : 1995   MRN: [id number]  Reason for Admission:   Discharge Date: 24       Spoke with: PT will follow OBGYN after Delivery    Discharge department/facility: [facility] Interactive Patient Contact:  Was patient able to fill all prescriptions:   Was patient instructed to bring all medications to the follow-up visit:   Is patient taking all medications as directed in the discharge summary?   Does patient understand their discharge instructions:   Does patient have questions or concerns that need addressed prior to 7-14 day follow up office visit:     Additional needs identified to be addressed with provider               Scheduled appointment with PCP within 7-14 days    Follow Up  Future Appointments   Date Time Provider Department Center   2024 10:20 AM Laverne Aguilar DO [facility] LPN

## 2024-08-16 ENCOUNTER — POSTPARTUM VISIT (OUTPATIENT)
Dept: OBGYN CLINIC | Age: 29
End: 2024-08-16

## 2024-08-16 VITALS
TEMPERATURE: 98 F | HEART RATE: 81 BPM | DIASTOLIC BLOOD PRESSURE: 84 MMHG | BODY MASS INDEX: 26.69 KG/M2 | SYSTOLIC BLOOD PRESSURE: 116 MMHG | WEIGHT: 160.2 LBS | HEIGHT: 65 IN

## 2024-08-16 DIAGNOSIS — N89.8 VAGINAL DISCHARGE: ICD-10-CM

## 2024-08-16 DIAGNOSIS — N89.8 VAGINAL IRRITATION: ICD-10-CM

## 2024-08-16 DIAGNOSIS — O09.93 HIGH-RISK PREGNANCY IN THIRD TRIMESTER: ICD-10-CM

## 2024-08-16 NOTE — PROGRESS NOTES
OB Postpartum Visit    HPI:   Patient is a 29 y.o.  s/p vaginal delivery here for her postpartum visit:     Pt doing well today. Bleeding staining only. Bowel function is normal. Bladder function is normal.   Patient is not sexually active.   Contraception method is abstinence.   EDPS: positive. PP Blues like symptoms  Baby has been doing well with some problems : slow weight gain.   Baby is feeding both breast and bottle -  Milk + Formula ([employer]) .     Review of Systems - The following ROS was otherwise negative, except as noted in the HPI: constitutional, respiratory, cardiovascular, gastrointestinal, genitourinary    OB History  OB History    Para Term  AB Living   1 1 1     1   SAB IAB Ectopic Molar Multiple Live Births           0 1      # Outcome Date GA Lbr Wai/2nd Weight Sex Type Anes PTL Lv   1 Term 24 39w2d 12:43 / 05:09 3.565 kg (7 lb 13.8 oz) F Vag-Spont EPI N PATRICK       Medications:  Current Outpatient Medications on File Prior to Visit   Medication Sig Dispense Refill    docusate sodium (COLACE, DULCOLAX) 100 MG CAPS Take 100 mg by mouth 2 times daily 120 capsule 0    Multiple Vitamins-Minerals (THERAPEUTIC MULTIVITAMIN-MINERALS) tablet Take 1 tablet by mouth daily       No current facility-administered medications on file prior to visit.        Objective:  /84 (Site: Left Upper Arm, Position: Sitting, Cuff Size: Medium Adult)   Pulse 81   Temp 98 °F (36.7 °C) (Infrared)   Ht 1.651 m (5' 5\")   Wt 72.7 kg (160 lb 3.2 oz)   LMP 10/30/2023 (Exact Date)   Breastfeeding Yes   BMI 26.66 kg/m²   General: Alert, well appearing, no acute distress  Lungs: Resp effort normal   CV: Regular rate  Abdomen: Soft, nontender, nondistended   Pelvic: External genitalia without masses or lesions. Repair well healed, sutures intact. Moist, pink vagina with yellow to green malodorous discharge. Swabs collected. Cervix without polyps or masses. Uterus mobile and midline without

## 2024-08-17 LAB
CANDIDA DNA VAG QL NAA+PROBE: ABNORMAL
G VAGINALIS DNA SPEC QL NAA+PROBE: ABNORMAL
T VAGINALIS DNA VAG QL NAA+PROBE: ABNORMAL

## 2024-08-18 PROBLEM — N89.8 VAGINAL DISCHARGE: Status: ACTIVE | Noted: 2024-08-18

## 2024-08-18 RX ORDER — CEPHALEXIN 500 MG/1
500 CAPSULE ORAL 4 TIMES DAILY
Qty: 28 CAPSULE | Refills: 0 | Status: SHIPPED | OUTPATIENT
Start: 2024-08-18 | End: 2024-08-25

## 2024-08-19 ENCOUNTER — TELEPHONE (OUTPATIENT)
Dept: OBGYN CLINIC | Age: 29
End: 2024-08-19

## 2024-08-19 NOTE — TELEPHONE ENCOUNTER
Pt was seen in office on Friday. She says you checked her urethra and it looked a little red. She says she has been having sharp pains when she voids. States it is \" painful\" to urinate. Pt reports she is currently on antibiotic ( keflex) for Mastitis that she was given by the NP. She has only been taking this since yesterday.   Discussed with Dr Aguilar and aware to wait for 48 hours to see if improvement post antibiotics and to call for evaluation if no improvement.

## 2024-08-20 NOTE — TELEPHONE ENCOUNTER
No new medication was indicated   Keflex would also cover a UTI -- thanks for the clarification   ANAI

## 2024-08-25 ENCOUNTER — TELEPHONE (OUTPATIENT)
Dept: OBGYN | Age: 29
End: 2024-08-25

## 2024-08-28 ENCOUNTER — LACTATION ENCOUNTER (OUTPATIENT)
Dept: LABOR AND DELIVERY | Age: 29
End: 2024-08-28

## 2024-08-28 NOTE — LACTATION NOTE
This note was copied from a baby's chart.        [facility]ation [facility]          Outpatient Lactation Assessment        Mother's Name:   Information for the patient's mother:  Vivian An [[id number]]   30 y.o. Baby's name: Jazmin Cho    OB Provider:[facility]  Pediatrician:  Dr. Mireya Bermudez [facility]   Phone:  [phone]  Gestational Age: Gestational Age: 39w2d     Age: 4 wk.o.     Reason for Consultation:  Maternal request d/t painful latch, redness noted under the right breast, with firmness to palpation. Mother began triple feeding per pediatrician recommendation d/t poor weight gain. After this she struggled with engorgement, and mastitis and stopped breastfeeding and pumping for a period of time, and saw a significant decrease in milk supply. Mother reports she has been offering the breast 2x per day the past few days, and would like to resume breastfeeding more often for bonding, but plans to mostly bottle feed with formula. Does not wish to resume pumping or q2h feedings that she was doing initially.    Maternal History:   Delivery Information:  Born on 7/31/[birth year]24 at 7:42 PM  Delivery method: Vaginal, Spontaneous [250]  Additional Information:  Forceps attempted? No [0]  Vacuum extractor attempted? No [0]  Breech:   Complications:     Medications in use: Multivitamin    Maternal Assessment:     Breast Assessment  Right Breast: Tender, Pink area noted under the areola.  Right Nipple: Everts well   Right Areola: WDL   Right Nipple Comfort: comfortable   Right Nipple Integrity: Intact    Left Breast: WDL  Left Nipple: Everts well   Left Areola: WDL   Left Nipple Comfort: comfortable   Left Nipple Integrity: Intact    Equipment in use at home:   Pumping: none/24 hours   Shield Size: 15mm and 17mm  Supply: None expressed    Infant Assessment:     Feeding: Breastfeeding with supplement  and Bottle Feeding   Breastfeeds: 2x in 24 hour period  Duration: 5-10 minutes/side    Use of

## 2024-09-03 ENCOUNTER — POSTPARTUM VISIT (OUTPATIENT)
Dept: OBGYN CLINIC | Age: 29
End: 2024-09-03

## 2024-09-03 VITALS
BODY MASS INDEX: 25.99 KG/M2 | HEART RATE: 75 BPM | DIASTOLIC BLOOD PRESSURE: 70 MMHG | SYSTOLIC BLOOD PRESSURE: 118 MMHG | WEIGHT: 156.2 LBS

## 2024-09-03 DIAGNOSIS — R30.0 DYSURIA: ICD-10-CM

## 2024-09-03 DIAGNOSIS — R10.2 VAGINAL PAIN: ICD-10-CM

## 2024-09-03 DIAGNOSIS — N64.4 BREAST PAIN: ICD-10-CM

## 2024-09-03 PROCEDURE — 0503F POSTPARTUM CARE VISIT: CPT | Performed by: OBSTETRICS & GYNECOLOGY

## 2024-09-04 LAB
BACTERIA UR CULT: NORMAL
BACTERIA URNS QL MICRO: ABNORMAL /HPF
BILIRUB UR QL STRIP.AUTO: NEGATIVE
CANDIDA DNA VAG QL NAA+PROBE: NORMAL
CLARITY UR: CLEAR
COLOR UR: YELLOW
EPI CELLS #/AREA URNS AUTO: 1 /HPF (ref 0–5)
G VAGINALIS DNA SPEC QL NAA+PROBE: NORMAL
GLUCOSE UR STRIP.AUTO-MCNC: NEGATIVE MG/DL
HGB UR QL STRIP.AUTO: ABNORMAL
HYALINE CASTS #/AREA URNS AUTO: 0 /LPF (ref 0–8)
KETONES UR STRIP.AUTO-MCNC: NEGATIVE MG/DL
LEUKOCYTE ESTERASE UR QL STRIP.AUTO: ABNORMAL
NITRITE UR QL STRIP.AUTO: NEGATIVE
PH UR STRIP.AUTO: 7 [PH] (ref 5–8)
PROT UR STRIP.AUTO-MCNC: NEGATIVE MG/DL
RBC CLUMPS #/AREA URNS AUTO: 11 /HPF (ref 0–4)
SP GR UR STRIP.AUTO: 1.01 (ref 1–1.03)
T VAGINALIS DNA VAG QL NAA+PROBE: NORMAL
UA DIPSTICK W REFLEX MICRO PNL UR: YES
URN SPEC COLLECT METH UR: ABNORMAL
UROBILINOGEN UR STRIP-ACNC: 0.2 E.U./DL
WBC #/AREA URNS AUTO: 3 /HPF (ref 0–5)

## 2024-09-18 ENCOUNTER — POSTPARTUM VISIT (OUTPATIENT)
Dept: OBGYN CLINIC | Age: 29
End: 2024-09-18

## 2024-09-18 VITALS
OXYGEN SATURATION: 98 % | DIASTOLIC BLOOD PRESSURE: 78 MMHG | HEART RATE: 97 BPM | BODY MASS INDEX: 25.92 KG/M2 | SYSTOLIC BLOOD PRESSURE: 124 MMHG | TEMPERATURE: 98.7 F | WEIGHT: 155.6 LBS | HEIGHT: 65 IN

## 2024-09-18 DIAGNOSIS — Z91.89 AT RISK FOR DEPRESSED MOOD DURING POSTPARTUM PERIOD: ICD-10-CM

## 2024-09-18 DIAGNOSIS — R10.2 VAGINAL PAIN: ICD-10-CM

## 2024-09-18 PROCEDURE — 0503F POSTPARTUM CARE VISIT: CPT | Performed by: OBSTETRICS & GYNECOLOGY

## 2024-09-23 PROBLEM — R10.2 VAGINAL PAIN: Status: ACTIVE | Noted: 2024-09-23

## 2024-09-23 PROBLEM — Z91.89 AT RISK FOR DEPRESSED MOOD DURING POSTPARTUM PERIOD: Status: ACTIVE | Noted: 2024-09-23

## 2024-09-26 ENCOUNTER — LAB (OUTPATIENT)
Dept: OBGYN CLINIC | Age: 29
End: 2024-09-26

## 2024-09-27 LAB
GLUCOSE 1H P 75 G GLC PO SERPL-MCNC: 175 MG/DL
GLUCOSE 2H P 75 G GLC PO SERPL-MCNC: 139 MG/DL
GLUCOSE BS SERPL-MCNC: 88 MG/DL

## 2024-10-01 ENCOUNTER — OFFICE VISIT (OUTPATIENT)
Dept: PRIMARY CARE CLINIC | Age: 29
End: 2024-10-01

## 2024-10-01 VITALS
DIASTOLIC BLOOD PRESSURE: 76 MMHG | HEART RATE: 88 BPM | WEIGHT: 155.4 LBS | SYSTOLIC BLOOD PRESSURE: 108 MMHG | BODY MASS INDEX: 25.86 KG/M2 | OXYGEN SATURATION: 95 %

## 2024-10-01 DIAGNOSIS — R39.9 URINARY TRACT INFECTION SYMPTOMS: Primary | ICD-10-CM

## 2024-10-01 LAB
BILIRUBIN, POC: NEGATIVE
BLOOD URINE, POC: NEGATIVE
CLARITY, POC: CLEAR
COLOR, POC: YELLOW
GLUCOSE URINE, POC: NEGATIVE MG/DL
KETONES, POC: NEGATIVE MG/DL
LEUKOCYTE EST, POC: ABNORMAL
NITRITE, POC: NEGATIVE
PH, POC: 7
PROTEIN, POC: NEGATIVE MG/DL
SPECIFIC GRAVITY, POC: 1.01
UROBILINOGEN, POC: ABNORMAL MG/DL

## 2024-10-01 NOTE — PROGRESS NOTES
PROGRESS NOTE   [facility]                                  [address], [address], [facility] [address]         Phone: [phone]    Date of Service:  10/1/2024     Patient ID: Isreal An is a 29 y.o. female      Subjective:     CC: Urinary symptoms    HPI  Patient is a 29-year-old female without significant past medical history    UTI symptoms   Patient has been experiencing urinary symptoms for the past 2 months.  Patient has been experiencing pain in the urethra but now is more of a discomfort with urination.  Patient tested her pH which was off at home.  Patient denies any fevers chills and has no concerns for an STI.  Patient did see OB/GYN for a pelvic exam with swab that showed Gardnerella vaginitis and was subsequently treated with antibiotics in August 16, 2024.      ROS: Negative otherwise known HPI        Vitals:    10/01/24 1209   BP: 108/76   Site: Left Upper Arm   Position: Sitting   Cuff Size: Small Adult   Pulse: 88   SpO2: 95%   Weight: 70.5 kg (155 lb 6.4 oz)       Allergies:  Amoxil [amoxicillin]    Outpatient Medications Marked as Taking for the 10/1/24 encounter (Office Visit) with Monalisa Miller DO   Medication Sig Dispense Refill    Multiple Vitamins-Minerals (THERAPEUTIC MULTIVITAMIN-MINERALS) tablet Take 1 tablet by mouth daily           Objective:   Constitutional:   Reviewed vitals above  Well Nourished, well developed, no distress       HENT:  Normal external nose without lesions  Bilateral TMs translucent with normal light reflex and bony landmarks  Normal oropharynx without erythema or exudate  Normal nasal mucosa without swelling or erythema  Neck:  Symmetric and without masses  No thyromegaly  Resp:  Normal effort  Clear to auscultation bilaterally without rhonchi, wheezing or crackles  Cardiovascular:  On auscultation, normal S1 and S2 without murmurs, rubs or gallops  No bruits of bilateral carotids and no

## 2024-10-02 LAB
C TRACH DNA UR QL NAA+PROBE: NEGATIVE
N GONORRHOEA DNA UR QL NAA+PROBE: NEGATIVE
SPECIMEN TYPE: NORMAL
TRICHOMONAS VAGINALIS SCREEN: NEGATIVE

## 2024-10-03 LAB — BACTERIA UR CULT: NORMAL

## 2025-01-30 PROBLEM — O36.63X0 FETAL MACROSOMIA IN PREGNANCY IN THIRD TRIMESTER: Status: RESOLVED | Noted: 2024-07-28 | Resolved: 2025-01-30

## 2025-01-30 PROBLEM — Z86.32 HISTORY OF GESTATIONAL DIABETES: Status: ACTIVE | Noted: 2024-06-22

## 2025-01-30 PROBLEM — O26.843 UTERINE SIZE-DATE DISCREPANCY IN THIRD TRIMESTER: Status: RESOLVED | Noted: 2024-06-22 | Resolved: 2025-01-30

## 2025-01-30 PROBLEM — B95.1 POSITIVE GBS TEST: Status: RESOLVED | Noted: 2024-07-28 | Resolved: 2025-01-30

## 2025-01-30 PROBLEM — Z34.93 PRENATAL CARE IN THIRD TRIMESTER: Status: RESOLVED | Noted: 2024-02-29 | Resolved: 2025-01-30

## 2025-02-21 ENCOUNTER — HOSPITAL ENCOUNTER (OUTPATIENT)
Dept: PHYSICAL THERAPY | Age: 30
Setting detail: THERAPIES SERIES
Discharge: HOME | End: 2025-02-21
Attending: OBSTETRICS & GYNECOLOGY
Payer: COMMERCIAL

## 2025-02-21 DIAGNOSIS — M62.89 PELVIC FLOOR TENSION: Primary | ICD-10-CM

## 2025-02-21 DIAGNOSIS — R19.8 ABDOMINAL WEAKNESS: ICD-10-CM

## 2025-02-21 DIAGNOSIS — N81.89 PELVIC FLOOR WEAKNESS IN FEMALE: ICD-10-CM

## 2025-02-21 PROCEDURE — 97530 THERAPEUTIC ACTIVITIES: CPT

## 2025-02-21 PROCEDURE — 97140 MANUAL THERAPY 1/> REGIONS: CPT

## 2025-02-21 PROCEDURE — 97161 PT EVAL LOW COMPLEX 20 MIN: CPT

## 2025-02-21 NOTE — PLAN OF CARE
[facility]: [address] [place] [address] office: [phone] fax: [phone]       Physical Therapy Initial Evaluation Certification    Dear Noni Gastelum DO,    We had the pleasure of evaluating the following patient for physical therapy services at [facility].  A summary of our findings can be found in the initial assessment below.  This includes our plan of care.  If you have any questions or concerns regarding these findings, please do not hesitate to contact me at the office phone number listed above.  Thank you for the referral.     Physician Signature:_______________________________Date:__________________  By signing above (or electronic signature), therapist’s plan is approved by physician       Physical Therapy: TREATMENT/PROGRESS NOTE   Patient: Vivian An (30 y.o. female)   Examination Date: 2025   :  1995 MRN: [id number]   Visit #: 1   Insurance Allowable Auth Needed   BMN []Yes    [x]No    Insurance: Payor: [employer] [facility] [place] / Plan: [facility] / Product Type: Indemnity /   Insurance ID: [id number] - ([facility])  Secondary Insurance (if applicable):    Treatment Diagnosis:     ICD-10-CM    1. Pelvic floor tension  M62.89       2. Pelvic floor weakness in female  N81.89       3. Abdominal weakness  R19.8          Medical Diagnosis:Pelvic floor dysfunction [M62.89]  Vaginal pain [R10.2]   Referring Physician: Noni Gastelum, *  PCP: Ronny Harper DO   Plan of care signed (Y/N): N    Date of Patient follow up with Physician: PRN     Plan of Care Report: EVAL today  POC update due: (10 visits /OR AUTH LIMITS, whichever is less)  3/21/2025                                             Medical History:  Comorbidities:  None  Relevant Medical History: see chart, reviewed with patient                                          Precautions/ Contra-indications:  Finasteride Pregnancy And Lactation Text: This medication is absolutely contraindicated during pregnancy. It is unknown if it is excreted in breast milk.

## 2025-02-28 ENCOUNTER — APPOINTMENT (OUTPATIENT)
Dept: PHYSICAL THERAPY | Age: 30
End: 2025-02-28
Attending: OBSTETRICS & GYNECOLOGY
Payer: COMMERCIAL

## 2025-03-28 ENCOUNTER — OFFICE VISIT (OUTPATIENT)
Dept: PRIMARY CARE CLINIC | Age: 30
End: 2025-03-28
Payer: COMMERCIAL

## 2025-03-28 VITALS
SYSTOLIC BLOOD PRESSURE: 120 MMHG | OXYGEN SATURATION: 96 % | HEIGHT: 65 IN | RESPIRATION RATE: 16 BRPM | HEART RATE: 82 BPM | BODY MASS INDEX: 25.69 KG/M2 | WEIGHT: 154.2 LBS | DIASTOLIC BLOOD PRESSURE: 82 MMHG | TEMPERATURE: 98.7 F

## 2025-03-28 DIAGNOSIS — Z13.1 SCREENING FOR DIABETES MELLITUS: ICD-10-CM

## 2025-03-28 DIAGNOSIS — Z13.220 SCREENING FOR HYPERLIPIDEMIA: ICD-10-CM

## 2025-03-28 DIAGNOSIS — R29.898 LEFT HAND WEAKNESS: Primary | ICD-10-CM

## 2025-03-28 PROCEDURE — 99214 OFFICE O/P EST MOD 30 MIN: CPT

## 2025-03-28 SDOH — ECONOMIC STABILITY: FOOD INSECURITY: WITHIN THE PAST 12 MONTHS, YOU WORRIED THAT YOUR FOOD WOULD RUN OUT BEFORE YOU GOT MONEY TO BUY MORE.: NEVER TRUE

## 2025-03-28 SDOH — ECONOMIC STABILITY: FOOD INSECURITY: WITHIN THE PAST 12 MONTHS, THE FOOD YOU BOUGHT JUST DIDN'T LAST AND YOU DIDN'T HAVE MONEY TO GET MORE.: NEVER TRUE

## 2025-03-28 ASSESSMENT — PATIENT HEALTH QUESTIONNAIRE - PHQ9
SUM OF ALL RESPONSES TO PHQ QUESTIONS 1-9: 0
1. LITTLE INTEREST OR PLEASURE IN DOING THINGS: NOT AT ALL
SUM OF ALL RESPONSES TO PHQ QUESTIONS 1-9: 0
2. FEELING DOWN, DEPRESSED OR HOPELESS: NOT AT ALL

## 2025-03-28 NOTE — PROGRESS NOTES
PROGRESS NOTE   [facility]                                  [address], [address], [facility] [address]         Phone: [phone]    Date of Service:  3/28/2025     Patient ID: Isreal An is a 30 y.o. female      Subjective:     CC:   Chief Complaint   Patient presents with    Annual Exam     Discuss hand strength and decreased sensation in finger tips.        HPI  Patient is a 30 year old female presenting to the office today for well adult exam    Patient notes she has been experiencing difficulty with left hand  and strength for the past few months. States symptoms initially began 8 months ago or so after her first pregnancy. Notes she would lay her baby across her left arm, and would sometimes cause some left hand numbness and tingling. However she has not done this in the past few months and symptoms are now occurring daily, with worsening loss of motor strength. She has difficulty grasping object, especially when she needs to open her hand wide. Denies any neck pain, shoulder pain, loss of sensation, left arm weakness, change in vision, pain in left hand, balance issues, vertigo, nausea, vomiting.     She is also concerned with potential of having diabetes in the future. Notes she did have gestational diabetes when pregnant and that diabetes runs in her family. Has checked her sugars at home and is getting a fasting blood glucose around . Denies any increase in urination or thirst.     ROS:    Review of Systems   All other systems reviewed and are negative.          Vitals:    03/28/25 1608   BP: 120/82   BP Site: Left Upper Arm   Patient Position: Sitting   BP Cuff Size: Medium Adult   Pulse: 82   Resp: 16   Temp: 98.7 °F (37.1 °C)   TempSrc: Oral   SpO2: 96%   Weight: 69.9 kg (154 lb 3.2 oz)   Height: 1.651 m (5' 5\")       Allergies:  Amoxil [amoxicillin]    Outpatient Medications Marked as Taking for the 3/28/25

## 2025-04-01 DIAGNOSIS — R29.898 LEFT HAND WEAKNESS: ICD-10-CM

## 2025-04-01 DIAGNOSIS — Z13.220 SCREENING FOR HYPERLIPIDEMIA: ICD-10-CM

## 2025-04-01 DIAGNOSIS — Z13.1 SCREENING FOR DIABETES MELLITUS: ICD-10-CM

## 2025-04-01 LAB
ALBUMIN SERPL-MCNC: 4.8 G/DL (ref 3.4–5)
ALBUMIN/GLOB SERPL: 1.9 {RATIO} (ref 1.1–2.2)
ALP SERPL-CCNC: 65 U/L (ref 40–129)
ALT SERPL-CCNC: 20 U/L (ref 10–40)
ANION GAP SERPL CALCULATED.3IONS-SCNC: 13 MMOL/L (ref 3–16)
AST SERPL-CCNC: 21 U/L (ref 15–37)
BASOPHILS # BLD: 0 K/UL (ref 0–0.2)
BASOPHILS NFR BLD: 0.8 %
BILIRUB SERPL-MCNC: 0.8 MG/DL (ref 0–1)
BUN SERPL-MCNC: 8 MG/DL (ref 7–20)
CALCIUM SERPL-MCNC: 9.5 MG/DL (ref 8.3–10.6)
CHLORIDE SERPL-SCNC: 101 MMOL/L (ref 99–110)
CHOLEST SERPL-MCNC: 153 MG/DL (ref 0–199)
CO2 SERPL-SCNC: 23 MMOL/L (ref 21–32)
CREAT SERPL-MCNC: 0.7 MG/DL (ref 0.6–1.1)
DEPRECATED RDW RBC AUTO: 12.9 % (ref 12.4–15.4)
EOSINOPHIL # BLD: 0.1 K/UL (ref 0–0.6)
EOSINOPHIL NFR BLD: 2.3 %
EST. AVERAGE GLUCOSE BLD GHB EST-MCNC: 111.2 MG/DL
FOLATE SERPL-MCNC: 24.3 NG/ML (ref 4.78–24.2)
GFR SERPLBLD CREATININE-BSD FMLA CKD-EPI: >90 ML/MIN/{1.73_M2}
GLUCOSE SERPL-MCNC: 93 MG/DL (ref 70–99)
HBA1C MFR BLD: 5.5 %
HCT VFR BLD AUTO: 45 % (ref 36–48)
HDLC SERPL-MCNC: 63 MG/DL (ref 40–60)
HGB BLD-MCNC: 15.3 G/DL (ref 12–16)
LDLC SERPL CALC-MCNC: 76 MG/DL
LYMPHOCYTES # BLD: 1.9 K/UL (ref 1–5.1)
LYMPHOCYTES NFR BLD: 29.6 %
MCH RBC QN AUTO: 31.3 PG (ref 26–34)
MCHC RBC AUTO-ENTMCNC: 33.9 G/DL (ref 31–36)
MCV RBC AUTO: 92.3 FL (ref 80–100)
MONOCYTES # BLD: 0.4 K/UL (ref 0–1.3)
MONOCYTES NFR BLD: 5.7 %
NEUTROPHILS # BLD: 3.9 K/UL (ref 1.7–7.7)
NEUTROPHILS NFR BLD: 61.6 %
PLATELET # BLD AUTO: 232 K/UL (ref 135–450)
PMV BLD AUTO: 10 FL (ref 5–10.5)
POTASSIUM SERPL-SCNC: 4.1 MMOL/L (ref 3.5–5.1)
PROT SERPL-MCNC: 7.3 G/DL (ref 6.4–8.2)
RBC # BLD AUTO: 4.87 M/UL (ref 4–5.2)
SODIUM SERPL-SCNC: 137 MMOL/L (ref 136–145)
TRIGL SERPL-MCNC: 68 MG/DL (ref 0–150)
TSH SERPL DL<=0.005 MIU/L-ACNC: 1.84 UIU/ML (ref 0.27–4.2)
VIT B12 SERPL-MCNC: 482 PG/ML (ref 211–911)
VLDLC SERPL CALC-MCNC: 14 MG/DL
WBC # BLD AUTO: 6.4 K/UL (ref 4–11)

## 2025-04-02 ENCOUNTER — RESULTS FOLLOW-UP (OUTPATIENT)
Dept: INTERNAL MEDICINE CLINIC | Age: 30
End: 2025-04-02

## 2025-04-28 ENCOUNTER — OFFICE VISIT (OUTPATIENT)
Dept: PRIMARY CARE CLINIC | Age: 30
End: 2025-04-28
Payer: COMMERCIAL

## 2025-04-28 VITALS
BODY MASS INDEX: 26.46 KG/M2 | SYSTOLIC BLOOD PRESSURE: 104 MMHG | DIASTOLIC BLOOD PRESSURE: 68 MMHG | WEIGHT: 159 LBS | HEART RATE: 100 BPM | OXYGEN SATURATION: 96 %

## 2025-04-28 DIAGNOSIS — R09.81 SINUS CONGESTION: Primary | ICD-10-CM

## 2025-04-28 DIAGNOSIS — J34.89 SINUS PAIN: ICD-10-CM

## 2025-04-28 PROCEDURE — 99213 OFFICE O/P EST LOW 20 MIN: CPT

## 2025-04-28 RX ORDER — CHOLECALCIFEROL (VITAMIN D3) 25 MCG
1 CAPSULE ORAL DAILY
COMMUNITY

## 2025-04-28 RX ORDER — AZITHROMYCIN 250 MG/1
TABLET, FILM COATED ORAL
Qty: 6 TABLET | Refills: 0 | Status: SHIPPED | OUTPATIENT
Start: 2025-04-28 | End: 2025-05-08

## 2025-04-28 NOTE — PROGRESS NOTES
PROGRESS NOTE   [facility]                                  [address], [address], [place] [address]         Phone: [phone]    Date of Service:  4/28/2025     Patient ID: Isreal An is a 30 y.o. female      Subjective:     CC:   Chief Complaint   Patient presents with    Other     Runny and stuffy and congestion for two weeks   Feeling worse over the past couple days      HPI  Patient is a 30-year-old female presenting to the office today due to acute concerns.      Her daughter was sick past few weeks, now patient started with symptoms.     2 weeks ago had runny nose (thought allergies) however symptoms worsened over the past few days.  What started with just a runny nose has now led to a cough with increased mucus production.  Mucus is a dark yellow in color.  Also with worsening sinus congestion and sinus tenderness.  No fevers.  No chills.  Does endorse some mild sore throat and cough.  Cough with yellow mucous production  Feels like something in throat    Notes when visiting family last week her dad had a cold and he was prescribed abx    No fevers or chills, no dyspnea, chest pains    ROS:    Review of Systems   All other systems reviewed and are negative.        Vitals:    04/28/25 1352   BP: 104/68   BP Site: Left Upper Arm   Patient Position: Sitting   BP Cuff Size: Medium Adult   Pulse: 100   SpO2: 96%   Weight: 72.1 kg (159 lb)       Allergies:  Amoxil [amoxicillin]    Outpatient Medications Marked as Taking for the 4/28/25 encounter (Office Visit) with Ronny Harper DO   Medication Sig Dispense Refill    vitamin D (VITAMIN D-3) 25 MCG (1000 UT) CAPS Take 1 capsule by mouth daily      azithromycin (ZITHROMAX) 250 MG tablet 500mg on day 1 followed by 250mg on days 2 - 5 6 tablet 0    Multiple Vitamins-Minerals (THERAPEUTIC MULTIVITAMIN-MINERALS) tablet Take 1 tablet by mouth daily           Objective:     Physical

## 2025-05-19 DIAGNOSIS — R29.898 LEFT HAND WEAKNESS: Primary | ICD-10-CM
